# Patient Record
Sex: FEMALE | Race: WHITE | NOT HISPANIC OR LATINO | Employment: FULL TIME | URBAN - METROPOLITAN AREA
[De-identification: names, ages, dates, MRNs, and addresses within clinical notes are randomized per-mention and may not be internally consistent; named-entity substitution may affect disease eponyms.]

---

## 2017-01-16 PROBLEM — M76.899 TENDINITIS OF KNEE: Status: ACTIVE | Noted: 2017-01-16

## 2017-10-24 ENCOUNTER — OFFICE VISIT (OUTPATIENT)
Dept: DERMATOLOGY | Facility: CLINIC | Age: 62
End: 2017-10-24
Payer: COMMERCIAL

## 2017-10-24 VITALS — HEIGHT: 66 IN | WEIGHT: 188 LBS | RESPIRATION RATE: 16 BRPM | BODY MASS INDEX: 30.22 KG/M2

## 2017-10-24 DIAGNOSIS — L57.0 MULTIPLE ACTINIC KERATOSES: Primary | ICD-10-CM

## 2017-10-24 DIAGNOSIS — Z12.83 SKIN CANCER SCREENING: ICD-10-CM

## 2017-10-24 DIAGNOSIS — L82.1 SEBORRHEIC KERATOSES: ICD-10-CM

## 2017-10-24 PROCEDURE — 99999 PR PBB SHADOW E&M-EST. PATIENT-LVL II: CPT | Mod: PBBFAC,,, | Performed by: DERMATOLOGY

## 2017-10-24 PROCEDURE — 17000 DESTRUCT PREMALG LESION: CPT | Mod: S$GLB,,, | Performed by: DERMATOLOGY

## 2017-10-24 PROCEDURE — 17003 DESTRUCT PREMALG LES 2-14: CPT | Mod: S$GLB,,, | Performed by: DERMATOLOGY

## 2017-10-24 PROCEDURE — 99213 OFFICE O/P EST LOW 20 MIN: CPT | Mod: 25,S$GLB,, | Performed by: DERMATOLOGY

## 2017-10-24 NOTE — PROGRESS NOTES
Subjective:       Patient ID:  Elizabeth Lane is a 62 y.o. female who presents for   Chief Complaint   Patient presents with    Skin Check    Lesion     Last seen Sept 2016   Lesion R calf few months - slightly raised , scaly - never treated   Lesion on back - grandaughter noticed a few months ago - asymptomatic   Scar from a grease burn yrs ago -  L hand has itchy , red spot - never treated     Phx AK's - cryo tx   Phx SCC on right foot excised by Gen Surgeon approx 6 years ago  Father hx of skin cancer  Spends much of her time outdoors doing yardwork          Review of Systems   Skin: Positive for itching, dry skin and activity-related sunscreen use. Negative for sensitivity to antibiotic ointment, sensitivity to bandage adhesive and tendency to form keloidal scars.   Hematologic/Lymphatic: Bruises/bleeds easily.        Objective:    Physical Exam   Skin:                          Diagram Legend     Erythematous scaling macule/papule c/w actinic keratosis       Vascular papule c/w angioma      Pigmented verrucoid papule/plaque c/w seborrheic keratosis      Yellow umbilicated papule c/w sebaceous hyperplasia      Irregularly shaped tan macule c/w lentigo     1-2 mm smooth white papules consistent with Milia      Movable subcutaneous cyst with punctum c/w epidermal inclusion cyst      Subcutaneous movable cyst c/w pilar cyst      Firm pink to brown papule c/w dermatofibroma      Pedunculated fleshy papule(s) c/w skin tag(s)      Evenly pigmented macule c/w junctional nevus     Mildly variegated pigmented, slightly irregular-bordered macule c/w mildly atypical nevus      Flesh colored to evenly pigmented papule c/w intradermal nevus       Pink pearly papule/plaque c/w basal cell carcinoma      Erythematous hyperkeratotic cursted plaque c/w SCC      Surgical scar with no sign of skin cancer recurrence      Open and closed comedones      Inflammatory papules and pustules      Verrucoid papule consistent  consistent with wart     Erythematous eczematous patches and plaques     Dystrophic onycholytic nail with subungual debris c/w onychomycosis     Umbilicated papule    Erythematous-base heme-crusted tan verrucoid plaque consistent with inflamed seborrheic keratosis     Erythematous Silvery Scaling Plaque c/w Psoriasis     See annotation      Assessment / Plan:        Multiple actinic keratoses  Cryosurgery Procedure Note    Verbal consent from the patient is obtained and the patient is aware of the precancerous quality and need for treatment of these lesions. Liquid nitrogen cryosurgery is applied to the 5 actinic keratoses, as detailed in the physical exam, to produce a freeze injury. The patient is aware that blisters may form and is instructed on wound care with gentle cleansing and use of vaseline ointment to keep moist until healed. The patient is supplied a handout on cryosurgery and is instructed to call if lesions do not completely resolve. Discussed risk postinflammatory pigmentary changes.       Skin cancer screening  Upper body skin examination performed today including at least 6 points as noted in physical examination. No lesions suspicious for malignancy noted.        Seborrheic keratoses, trunk and extremities  These are benign inherited growths without a malignant potential. Reassurance given to patient. No treatment is necessary.                Return in about 6 months (around 4/24/2018).

## 2018-04-24 ENCOUNTER — OFFICE VISIT (OUTPATIENT)
Dept: DERMATOLOGY | Facility: CLINIC | Age: 63
End: 2018-04-24
Payer: COMMERCIAL

## 2018-04-24 DIAGNOSIS — Z85.828 PERSONAL HISTORY OF OTHER MALIGNANT NEOPLASM OF SKIN: Primary | ICD-10-CM

## 2018-04-24 DIAGNOSIS — L57.0 MULTIPLE ACTINIC KERATOSES: ICD-10-CM

## 2018-04-24 DIAGNOSIS — Z12.83 SKIN CANCER SCREENING: ICD-10-CM

## 2018-04-24 PROCEDURE — 17000 DESTRUCT PREMALG LESION: CPT | Mod: S$GLB,,, | Performed by: DERMATOLOGY

## 2018-04-24 PROCEDURE — 17003 DESTRUCT PREMALG LES 2-14: CPT | Mod: S$GLB,,, | Performed by: DERMATOLOGY

## 2018-04-24 PROCEDURE — 99999 PR PBB SHADOW E&M-EST. PATIENT-LVL II: CPT | Mod: PBBFAC,,, | Performed by: DERMATOLOGY

## 2018-04-24 PROCEDURE — 99213 OFFICE O/P EST LOW 20 MIN: CPT | Mod: 25,S$GLB,, | Performed by: DERMATOLOGY

## 2018-04-24 NOTE — PROGRESS NOTES
Subjective:       Patient ID:  Elizabeth Lane is a 62 y.o. female who presents for   Chief Complaint   Patient presents with    Skin Check     Patient here for skin check. Last seen on 10-. No new or concerning moles today     Phx AK's - cryo tx   Phx SCC on right foot excised by Gen Surgeon approx 6 years ago  Father hx of skin cancer  Spends much of her time outdoors doing yardwork                 scaly area left forearm, comes and goes, weeks. No tx.     Review of Systems   Skin: Positive for activity-related sunscreen use. Negative for itching, rash and daily sunscreen use.        Objective:    Physical Exam   Skin:                      Diagram Legend     Erythematous scaling macule/papule c/w actinic keratosis       Vascular papule c/w angioma      Pigmented verrucoid papule/plaque c/w seborrheic keratosis      Yellow umbilicated papule c/w sebaceous hyperplasia      Irregularly shaped tan macule c/w lentigo     1-2 mm smooth white papules consistent with Milia      Movable subcutaneous cyst with punctum c/w epidermal inclusion cyst      Subcutaneous movable cyst c/w pilar cyst      Firm pink to brown papule c/w dermatofibroma      Pedunculated fleshy papule(s) c/w skin tag(s)      Evenly pigmented macule c/w junctional nevus     Mildly variegated pigmented, slightly irregular-bordered macule c/w mildly atypical nevus      Flesh colored to evenly pigmented papule c/w intradermal nevus       Pink pearly papule/plaque c/w basal cell carcinoma      Erythematous hyperkeratotic cursted plaque c/w SCC      Surgical scar with no sign of skin cancer recurrence      Open and closed comedones      Inflammatory papules and pustules      Verrucoid papule consistent consistent with wart     Erythematous eczematous patches and plaques     Dystrophic onycholytic nail with subungual debris c/w onychomycosis     Umbilicated papule    Erythematous-base heme-crusted tan verrucoid plaque consistent with inflamed  seborrheic keratosis     Erythematous Silvery Scaling Plaque c/w Psoriasis     See annotation      Assessment / Plan:        Personal history of other malignant neoplasm of skin  Area(s) of previous NMSC evaluated with no signs of recurrence.    Upper body skin examination performed today including at least 6 points as noted in physical examination. No lesions suspicious for malignancy noted.    Skin cancer screening  Area(s) of previous NMSC evaluated with no signs of recurrence.    Upper body skin examination performed today including at least 6 points as noted in physical examination. No lesions suspicious for malignancy noted.      Multiple actinic keratoses  Cryosurgery Procedure Note    Verbal consent from the patient is obtained and the patient is aware of the precancerous quality and need for treatment of these lesions. Liquid nitrogen cryosurgery is applied to the 2 actinic keratoses, as detailed in the physical exam, to produce a freeze injury. The patient is aware that blisters may form and is instructed on wound care with gentle cleansing and use of vaseline ointment to keep moist until healed. The patient is supplied a handout on cryosurgery and is instructed to call if lesions do not completely resolve. Discussed risk postinflammatory pigmentary changes.                Follow-up in about 6 months (around 10/24/2018).

## 2019-01-31 ENCOUNTER — OFFICE VISIT (OUTPATIENT)
Dept: DERMATOLOGY | Facility: CLINIC | Age: 64
End: 2019-01-31
Payer: COMMERCIAL

## 2019-01-31 DIAGNOSIS — L82.1 SEBORRHEIC KERATOSES: Primary | ICD-10-CM

## 2019-01-31 PROCEDURE — 99213 OFFICE O/P EST LOW 20 MIN: CPT | Mod: S$GLB,,, | Performed by: DERMATOLOGY

## 2019-01-31 PROCEDURE — 99999 PR PBB SHADOW E&M-EST. PATIENT-LVL II: ICD-10-PCS | Mod: PBBFAC,,, | Performed by: DERMATOLOGY

## 2019-01-31 PROCEDURE — 99999 PR PBB SHADOW E&M-EST. PATIENT-LVL II: CPT | Mod: PBBFAC,,, | Performed by: DERMATOLOGY

## 2019-01-31 PROCEDURE — 99213 PR OFFICE/OUTPT VISIT, EST, LEVL III, 20-29 MIN: ICD-10-PCS | Mod: S$GLB,,, | Performed by: DERMATOLOGY

## 2019-10-11 ENCOUNTER — OFFICE VISIT (OUTPATIENT)
Dept: DERMATOLOGY | Facility: CLINIC | Age: 64
End: 2019-10-11
Payer: COMMERCIAL

## 2019-10-11 VITALS — BODY MASS INDEX: 31 KG/M2 | HEIGHT: 66 IN | RESPIRATION RATE: 18 BRPM | WEIGHT: 192.88 LBS

## 2019-10-11 DIAGNOSIS — Q82.8 POROKERATOSIS: Primary | ICD-10-CM

## 2019-10-11 DIAGNOSIS — L57.0 ACTINIC KERATOSIS: ICD-10-CM

## 2019-10-11 PROCEDURE — 99214 PR OFFICE/OUTPT VISIT, EST, LEVL IV, 30-39 MIN: ICD-10-PCS | Mod: S$GLB,,, | Performed by: DERMATOLOGY

## 2019-10-11 PROCEDURE — 99999 PR PBB SHADOW E&M-EST. PATIENT-LVL III: ICD-10-PCS | Mod: PBBFAC,,, | Performed by: DERMATOLOGY

## 2019-10-11 PROCEDURE — 99999 PR PBB SHADOW E&M-EST. PATIENT-LVL III: CPT | Mod: PBBFAC,,, | Performed by: DERMATOLOGY

## 2019-10-11 PROCEDURE — 99214 OFFICE O/P EST MOD 30 MIN: CPT | Mod: S$GLB,,, | Performed by: DERMATOLOGY

## 2019-10-11 RX ORDER — FLUOROURACIL 50 MG/G
CREAM TOPICAL 2 TIMES DAILY
Qty: 40 G | Refills: 1 | Status: SHIPPED | OUTPATIENT
Start: 2019-10-11 | End: 2020-01-08

## 2019-10-11 NOTE — PROGRESS NOTES
Subjective:       Patient ID:  Elizabeth Lane is a 64 y.o. female who presents for   Chief Complaint   Patient presents with    Spot     HPI  65 yo F presents for follow up.  Last OV 1/31/2019.     Patient complains of lesion(s)  Location: B lower legs  Duration: 3 months  Symptoms: NA  Relieving factors/Previous treatments: Not bothering the patient she would just like to have them checked.     No family h/o similar lesions    Review of Systems   Constitutional: Negative for fever, chills and fatigue.   Skin: Positive for itching, dry skin, daily sunscreen use and activity-related sunscreen use. Negative for rash and wears hat.   Hematologic/Lymphatic: Bruises/bleeds easily.       Past Medical History:   Diagnosis Date    Congenital coronary artery anomaly     Liver enzyme elevation     Pure hypercholesterolemia     Scoliosis (and kyphoscoliosis), idiopathic     THORAIC SPINE    Unspecified hypothyroidism        Objective:    Physical Exam   Constitutional: She appears well-developed and well-nourished.   HENT:   Mouth/Throat: Lips normal.    Eyes: Lids are normal.    Cardiovascular: There is no local extremity swelling.     Neurological: She is alert and oriented to person, place, and time.   Psychiatric: She has a normal mood and affect.   Skin:   Areas Examined (abnormalities noted in diagram):   Head / Face Inspection Performed  Neck Inspection Performed  RUE Inspected  LUE Inspection Performed  RLE Inspected  LLE Inspection Performed              Diagram Legend     Erythematous scaling macule/papule c/w actinic keratosis       Vascular papule c/w angioma      Pigmented verrucoid papule/plaque c/w seborrheic keratosis      Yellow umbilicated papule c/w sebaceous hyperplasia      Irregularly shaped tan macule c/w lentigo     1-2 mm smooth white papules consistent with Milia      Movable subcutaneous cyst with punctum c/w epidermal inclusion cyst      Subcutaneous movable cyst c/w pilar cyst      Firm  pink to brown papule c/w dermatofibroma      Pedunculated fleshy papule(s) c/w skin tag(s)      Evenly pigmented macule c/w junctional nevus     Mildly variegated pigmented, slightly irregular-bordered macule c/w mildly atypical nevus      Flesh colored to evenly pigmented papule c/w intradermal nevus       Pink pearly papule/plaque c/w basal cell carcinoma      Erythematous hyperkeratotic cursted plaque c/w SCC      Surgical scar with no sign of skin cancer recurrence      Open and closed comedones      Inflammatory papules and pustules      Verrucoid papule consistent consistent with wart     Erythematous eczematous patches and plaques     Dystrophic onycholytic nail with subungual debris c/w onychomycosis     Umbilicated papule    Erythematous-base heme-crusted tan verrucoid plaque consistent with inflamed seborrheic keratosis     Erythematous Silvery Scaling Plaque c/w Psoriasis     See annotation      Assessment / Plan:        Porokeratoses (DSAP)/few AKs  Handout provided  Niacinamide 500 mg bid  Discussed the likely erythema, crusting, swelling, inflammation and showed photos of what to expect  -     fluorouracil (EFUDEX) 5 % cream; Apply topically 2 (two) times daily.  Dispense: 40 g; Refill: 1             Follow up in about 4 weeks (around 11/8/2019).

## 2019-10-23 ENCOUNTER — PATIENT MESSAGE (OUTPATIENT)
Dept: DERMATOLOGY | Facility: CLINIC | Age: 64
End: 2019-10-23

## 2019-10-24 ENCOUNTER — OFFICE VISIT (OUTPATIENT)
Dept: ENDOCRINOLOGY | Facility: CLINIC | Age: 64
End: 2019-10-24
Payer: COMMERCIAL

## 2019-10-24 ENCOUNTER — TELEPHONE (OUTPATIENT)
Dept: ENDOCRINOLOGY | Facility: CLINIC | Age: 64
End: 2019-10-24

## 2019-10-24 VITALS
HEIGHT: 66 IN | SYSTOLIC BLOOD PRESSURE: 162 MMHG | BODY MASS INDEX: 31.18 KG/M2 | WEIGHT: 194 LBS | DIASTOLIC BLOOD PRESSURE: 80 MMHG | HEART RATE: 70 BPM

## 2019-10-24 DIAGNOSIS — E03.9 HYPOTHYROIDISM, UNSPECIFIED TYPE: Primary | ICD-10-CM

## 2019-10-24 DIAGNOSIS — E03.9 ACQUIRED HYPOTHYROIDISM: ICD-10-CM

## 2019-10-24 DIAGNOSIS — R53.83 FATIGUE, UNSPECIFIED TYPE: ICD-10-CM

## 2019-10-24 PROCEDURE — 99204 PR OFFICE/OUTPT VISIT, NEW, LEVL IV, 45-59 MIN: ICD-10-PCS | Mod: S$GLB,,, | Performed by: INTERNAL MEDICINE

## 2019-10-24 PROCEDURE — 99204 OFFICE O/P NEW MOD 45 MIN: CPT | Mod: S$GLB,,, | Performed by: INTERNAL MEDICINE

## 2019-10-24 PROCEDURE — 99999 PR PBB SHADOW E&M-EST. PATIENT-LVL III: CPT | Mod: PBBFAC,,, | Performed by: INTERNAL MEDICINE

## 2019-10-24 PROCEDURE — 99999 PR PBB SHADOW E&M-EST. PATIENT-LVL III: ICD-10-PCS | Mod: PBBFAC,,, | Performed by: INTERNAL MEDICINE

## 2019-10-24 RX ORDER — LEVOTHYROXINE SODIUM 112 UG/1
112 TABLET ORAL DAILY
Qty: 30 TABLET | Refills: 11
Start: 2019-10-24 | End: 2020-07-09 | Stop reason: SDUPTHER

## 2019-10-24 RX ORDER — NIACINAMIDE 500 MG
500 TABLET ORAL NIGHTLY
COMMUNITY
End: 2020-07-09

## 2019-10-24 NOTE — PROGRESS NOTES
CHIEF COMPLAINT: Elevated TSH  64 year old being seen as a new patient. Hypothyroidism since early 40's. On synthroid 112 mcg. On brandname. Dose increased based on 9/6/19 labs.  Takes it by itself. Has not missed doses. States still fatigued but somewhat better. Sleeping 6 hours of sleep. No snoring.  Has difficulty losing weight. States sometimes BP at home usually 130/70's.       PAST MEDICAL HISTORY/PAST SURGICAL HISTORY:  Reviewed in [facility]    SOCIAL HISTORY: No T/A    FAMILY HISTORY:  No known thyroid disease. + Dm 2.     MEDICATIONS/ALLERGIES: The patient's MedCard has been updated and reviewed.      ROS:   Constitutional: No recent significant weight change  Eyes: No recent visual changes  ENT: No dysphagia  Cardiovascular: Denies current anginal symptoms  Respiratory: Denies current respiratory difficulty  Gastrointestinal: Denies recent bowel disturbances  GenitoUrinary - No dysuria  Skin: No new skin rash  Neurologic: No focal neurologic complaints  Remainder ROS negative        PE:    GENERAL: Well developed, well nourished.  PSYCH:  appropriate mood and affect  EYES:  PERRL, EOM intact.  ENT: Nares patent, oropharynx clear, mucosa pink,   NECK: Supple, trachea midline, No palpable thyroid nodules.   CHEST: Resp even and unlabored, CTA bilateral.  CARDIAC: RRR, S1, S2 heard, no murmurs, rubs, S3, or S4  ABDOMEN: Soft, non-tender, non-distended;  No organomegaly  VASCULAR:  DP pulses +2/4 bilaterally, no edema  NEURO: Gait steady, CN II-VII grossly intact  SKIN: No areas of breakdown, no acanthosis nigracans.    LABS   Results for PHANI FORTUNE (MRN [id number]) as of 10/24/2019 09:19   Ref. Range 9/6/2019 07:47   TSH Latest Ref Range: 0.400 - 4.000 uIU/mL 7.610 (H)       ASSESSMENT/PLAN:  1. Hypothyroidism- Last TSH elevated. Main symptoms are fatigue and decreased ability to lose weight. Dose recently increased. She is taking it by itself. Will check TFT.     2. HTN- BP elevated. Repeat elevated as  well. States BP lower at home. Discussed that abnormal TFT can cause elevated BP    3. Fatigue- discussed diet and exercise and sleep adequacy      FOLLOWUP  TSH, FT4- Today  F/u 1 yr with TSH, FT4

## 2019-11-13 ENCOUNTER — OFFICE VISIT (OUTPATIENT)
Dept: DERMATOLOGY | Facility: CLINIC | Age: 64
End: 2019-11-13
Payer: COMMERCIAL

## 2019-11-13 VITALS — WEIGHT: 194 LBS | HEIGHT: 66 IN | BODY MASS INDEX: 31.18 KG/M2

## 2019-11-13 DIAGNOSIS — L81.9 HYPERPIGMENTATION: ICD-10-CM

## 2019-11-13 DIAGNOSIS — Q82.8 POROKERATOSIS: Primary | ICD-10-CM

## 2019-11-13 DIAGNOSIS — L82.1 SEBORRHEIC KERATOSES: ICD-10-CM

## 2019-11-13 PROCEDURE — 99213 PR OFFICE/OUTPT VISIT, EST, LEVL III, 20-29 MIN: ICD-10-PCS | Mod: S$GLB,,, | Performed by: DERMATOLOGY

## 2019-11-13 PROCEDURE — 99999 PR PBB SHADOW E&M-EST. PATIENT-LVL II: ICD-10-PCS | Mod: PBBFAC,,, | Performed by: DERMATOLOGY

## 2019-11-13 PROCEDURE — 99999 PR PBB SHADOW E&M-EST. PATIENT-LVL II: CPT | Mod: PBBFAC,,, | Performed by: DERMATOLOGY

## 2019-11-13 PROCEDURE — 99213 OFFICE O/P EST LOW 20 MIN: CPT | Mod: S$GLB,,, | Performed by: DERMATOLOGY

## 2019-11-13 NOTE — PROGRESS NOTES
Subjective:       Patient ID:  Elizabeth Lane is a 64 y.o. female who presents for   Chief Complaint   Patient presents with    Follow-up     65 yo F presents for follow up.  Last office visit 10/11/2019. The lesions on her legs resolved after a few weeks of applying Efudex and taking niacinamide 500 mg bid.   She tried applying Efudex to some brown spots on her chest that she wanted to try to treat and noticed a very painful, red, itchy rash so she stopped applying it.     Derm Hx: SCC - 2012; AKs  Denies history of family skin cancer           Past Medical History:   Diagnosis Date    Congenital coronary artery anomaly     Liver enzyme elevation     Pure hypercholesterolemia     Scoliosis (and kyphoscoliosis), idiopathic     THORAIC SPINE    Unspecified hypothyroidism      Review of Systems   Constitutional: Negative for fever, chills and fatigue.   Skin: Positive for itching, dry skin, daily sunscreen use and activity-related sunscreen use. Negative for rash and wears hat.   Hematologic/Lymphatic: Bruises/bleeds easily.        Objective:    Physical Exam   Constitutional: She appears well-developed and well-nourished.   HENT:   Mouth/Throat: Lips normal.    Eyes: Lids are normal.    Cardiovascular: There is no local extremity swelling.     Neurological: She is alert and oriented to person, place, and time.   Psychiatric: She has a normal mood and affect.   Skin:   Areas Examined (abnormalities noted in diagram):   Head / Face Inspection Performed  Neck Inspection Performed  Chest / Axilla Inspection Performed  Abdomen Inspection Performed  RUE Inspected  LUE Inspection Performed  RLE Inspected  LLE Inspection Performed              Diagram Legend     Erythematous scaling macule/papule c/w actinic keratosis       Vascular papule c/w angioma      Pigmented verrucoid papule/plaque c/w seborrheic keratosis      Yellow umbilicated papule c/w sebaceous hyperplasia      Irregularly shaped tan macule c/w  lentigo     1-2 mm smooth white papules consistent with Milia      Movable subcutaneous cyst with punctum c/w epidermal inclusion cyst      Subcutaneous movable cyst c/w pilar cyst      Firm pink to brown papule c/w dermatofibroma      Pedunculated fleshy papule(s) c/w skin tag(s)      Evenly pigmented macule c/w junctional nevus     Mildly variegated pigmented, slightly irregular-bordered macule c/w mildly atypical nevus      Flesh colored to evenly pigmented papule c/w intradermal nevus       Pink pearly papule/plaque c/w basal cell carcinoma      Erythematous hyperkeratotic cursted plaque c/w SCC      Surgical scar with no sign of skin cancer recurrence      Open and closed comedones      Inflammatory papules and pustules      Verrucoid papule consistent consistent with wart     Erythematous eczematous patches and plaques     Dystrophic onycholytic nail with subungual debris c/w onychomycosis     Umbilicated papule    Erythematous-base heme-crusted tan verrucoid plaque consistent with inflamed seborrheic keratosis     Erythematous Silvery Scaling Plaque c/w Psoriasis     See annotation      Assessment / Plan:        Porokeratosis/DSAP  Now clear  Did this respond to the Efudex or niacinamide? Unsure but pt will likely continue the niacinamide for now    Seborrheic keratoses  These are benign inherited growths without a malignant potential. Reassurance given to patient. No treatment is necessary.     Hyperpigmentation  PIPA s/p Efudex to abdomen/inframammary  Do not use in this area anymore           Follow up in about 1 year (around 11/13/2020).

## 2020-01-08 PROBLEM — R25.2 MUSCLE CRAMPS: Status: ACTIVE | Noted: 2020-01-08

## 2021-10-27 ENCOUNTER — OFFICE VISIT (OUTPATIENT)
Dept: OBSTETRICS AND GYNECOLOGY | Facility: CLINIC | Age: 66
End: 2021-10-27
Payer: COMMERCIAL

## 2021-10-27 VITALS — SYSTOLIC BLOOD PRESSURE: 120 MMHG | DIASTOLIC BLOOD PRESSURE: 70 MMHG | BODY MASS INDEX: 31.47 KG/M2 | WEIGHT: 195 LBS

## 2021-10-27 DIAGNOSIS — Z12.72 SPECIAL SCREENING FOR MALIGNANT NEOPLASMS, VAGINA: ICD-10-CM

## 2021-10-27 DIAGNOSIS — Z01.419 ENCOUNTER FOR ANNUAL ROUTINE GYNECOLOGICAL EXAMINATION: Primary | ICD-10-CM

## 2021-10-27 PROCEDURE — 88175 CYTOPATH C/V AUTO FLUID REDO: CPT | Performed by: OBSTETRICS & GYNECOLOGY

## 2021-10-27 PROCEDURE — 99397 PR PREVENTIVE VISIT,EST,65 & OVER: ICD-10-PCS | Mod: S$GLB,,, | Performed by: OBSTETRICS & GYNECOLOGY

## 2021-10-27 PROCEDURE — 99999 PR PBB SHADOW E&M-EST. PATIENT-LVL III: CPT | Mod: PBBFAC,,, | Performed by: OBSTETRICS & GYNECOLOGY

## 2021-10-27 PROCEDURE — 99397 PER PM REEVAL EST PAT 65+ YR: CPT | Mod: S$GLB,,, | Performed by: OBSTETRICS & GYNECOLOGY

## 2021-10-27 PROCEDURE — 99999 PR PBB SHADOW E&M-EST. PATIENT-LVL III: ICD-10-PCS | Mod: PBBFAC,,, | Performed by: OBSTETRICS & GYNECOLOGY

## 2021-11-03 LAB
FINAL PATHOLOGIC DIAGNOSIS: NORMAL
Lab: NORMAL

## 2022-08-01 ENCOUNTER — TELEPHONE (OUTPATIENT)
Dept: DERMATOLOGY | Facility: CLINIC | Age: 67
End: 2022-08-01
Payer: COMMERCIAL

## 2022-08-01 NOTE — TELEPHONE ENCOUNTER
----- Message from Jane Ellington sent at 8/1/2022  9:44 AM CDT -----  Contact: Patient  Type:  Patient Call          Who Called:Patient         Does the patient know what this is regarding?:requesting a call back to schedule a appt for her cancer screening states she way over due ; she's an established PT please advise           Would the patient rather a call back or a response via [facility]? Call           Best Call Back Number: [phone]             Additional Information:

## 2022-10-03 ENCOUNTER — OFFICE VISIT (OUTPATIENT)
Dept: URGENT CARE | Facility: CLINIC | Age: 67
End: 2022-10-03
Payer: COMMERCIAL

## 2022-10-03 VITALS
RESPIRATION RATE: 18 BRPM | DIASTOLIC BLOOD PRESSURE: 82 MMHG | OXYGEN SATURATION: 99 % | HEIGHT: 66 IN | BODY MASS INDEX: 32.78 KG/M2 | TEMPERATURE: 99 F | WEIGHT: 204 LBS | HEART RATE: 102 BPM | SYSTOLIC BLOOD PRESSURE: 153 MMHG

## 2022-10-03 DIAGNOSIS — B34.9 ACUTE VIRAL SYNDROME: Primary | ICD-10-CM

## 2022-10-03 DIAGNOSIS — R05.9 COUGH, UNSPECIFIED TYPE: ICD-10-CM

## 2022-10-03 LAB
CTP QC/QA: YES
CTP QC/QA: YES
POC MOLECULAR INFLUENZA A AGN: NEGATIVE
POC MOLECULAR INFLUENZA B AGN: NEGATIVE
SARS-COV-2 RDRP RESP QL NAA+PROBE: NEGATIVE

## 2022-10-03 PROCEDURE — 3079F DIAST BP 80-89 MM HG: CPT | Mod: CPTII,S$GLB,, | Performed by: PHYSICIAN ASSISTANT

## 2022-10-03 PROCEDURE — 1125F AMNT PAIN NOTED PAIN PRSNT: CPT | Mod: CPTII,S$GLB,, | Performed by: PHYSICIAN ASSISTANT

## 2022-10-03 PROCEDURE — 4010F PR ACE/ARB THEARPY RXD/TAKEN: ICD-10-PCS | Mod: CPTII,S$GLB,, | Performed by: PHYSICIAN ASSISTANT

## 2022-10-03 PROCEDURE — 3008F PR BODY MASS INDEX (BMI) DOCUMENTED: ICD-10-PCS | Mod: CPTII,S$GLB,, | Performed by: PHYSICIAN ASSISTANT

## 2022-10-03 PROCEDURE — 99213 PR OFFICE/OUTPT VISIT, EST, LEVL III, 20-29 MIN: ICD-10-PCS | Mod: S$GLB,,, | Performed by: PHYSICIAN ASSISTANT

## 2022-10-03 PROCEDURE — 1160F PR REVIEW ALL MEDS BY PRESCRIBER/CLIN PHARMACIST DOCUMENTED: ICD-10-PCS | Mod: CPTII,S$GLB,, | Performed by: PHYSICIAN ASSISTANT

## 2022-10-03 PROCEDURE — 1159F PR MEDICATION LIST DOCUMENTED IN MEDICAL RECORD: ICD-10-PCS | Mod: CPTII,S$GLB,, | Performed by: PHYSICIAN ASSISTANT

## 2022-10-03 PROCEDURE — 1159F MED LIST DOCD IN RCRD: CPT | Mod: CPTII,S$GLB,, | Performed by: PHYSICIAN ASSISTANT

## 2022-10-03 PROCEDURE — U0002 COVID-19 LAB TEST NON-CDC: HCPCS | Mod: QW,S$GLB,, | Performed by: PHYSICIAN ASSISTANT

## 2022-10-03 PROCEDURE — 87502 INFLUENZA DNA AMP PROBE: CPT | Mod: QW,S$GLB,, | Performed by: PHYSICIAN ASSISTANT

## 2022-10-03 PROCEDURE — 99213 OFFICE O/P EST LOW 20 MIN: CPT | Mod: S$GLB,,, | Performed by: PHYSICIAN ASSISTANT

## 2022-10-03 PROCEDURE — 3077F PR MOST RECENT SYSTOLIC BLOOD PRESSURE >= 140 MM HG: ICD-10-PCS | Mod: CPTII,S$GLB,, | Performed by: PHYSICIAN ASSISTANT

## 2022-10-03 PROCEDURE — 1125F PR PAIN SEVERITY QUANTIFIED, PAIN PRESENT: ICD-10-PCS | Mod: CPTII,S$GLB,, | Performed by: PHYSICIAN ASSISTANT

## 2022-10-03 PROCEDURE — U0002: ICD-10-PCS | Mod: QW,S$GLB,, | Performed by: PHYSICIAN ASSISTANT

## 2022-10-03 PROCEDURE — 4010F ACE/ARB THERAPY RXD/TAKEN: CPT | Mod: CPTII,S$GLB,, | Performed by: PHYSICIAN ASSISTANT

## 2022-10-03 PROCEDURE — 87502 POCT INFLUENZA A/B MOLECULAR: ICD-10-PCS | Mod: QW,S$GLB,, | Performed by: PHYSICIAN ASSISTANT

## 2022-10-03 PROCEDURE — 3079F PR MOST RECENT DIASTOLIC BLOOD PRESSURE 80-89 MM HG: ICD-10-PCS | Mod: CPTII,S$GLB,, | Performed by: PHYSICIAN ASSISTANT

## 2022-10-03 PROCEDURE — 3077F SYST BP >= 140 MM HG: CPT | Mod: CPTII,S$GLB,, | Performed by: PHYSICIAN ASSISTANT

## 2022-10-03 PROCEDURE — 1160F RVW MEDS BY RX/DR IN RCRD: CPT | Mod: CPTII,S$GLB,, | Performed by: PHYSICIAN ASSISTANT

## 2022-10-03 PROCEDURE — 3008F BODY MASS INDEX DOCD: CPT | Mod: CPTII,S$GLB,, | Performed by: PHYSICIAN ASSISTANT

## 2022-10-03 RX ORDER — PROMETHAZINE HYDROCHLORIDE AND DEXTROMETHORPHAN HYDROBROMIDE 6.25; 15 MG/5ML; MG/5ML
5 SYRUP ORAL
Qty: 118 ML | Refills: 0 | Status: SHIPPED | OUTPATIENT
Start: 2022-10-03 | End: 2022-11-18

## 2022-10-03 NOTE — PROGRESS NOTES
"Subjective:       Patient ID: Elizabeth Lane is a 67 y.o. female.    Vitals:  height is 5' 6" (1.676 m) and weight is 92.5 kg (204 lb). Her temporal temperature is 98.5 °F (36.9 °C). Her blood pressure is 153/82 (abnormal) and her pulse is 102. Her respiration is 18 and oxygen saturation is 99%.     Chief Complaint: Cough    Pt c/o cough, congestion headaches, otalgia, sore throat, swollen glands, chills and fatigue x 3 days. Has been exposed to granddaughter who was Flu A+. Tylenol and OTC cold/flu with mild relief.  Patient also reports getting her Prevnar vaccine few weeks ago.  Says she had vertigo for about a week that resolved few days ago.    Cough  This is a new problem. The current episode started in the past 7 days. The problem has been unchanged. The cough is Non-productive. Associated symptoms include chills, ear congestion, ear pain, headaches, nasal congestion and a sore throat. Pertinent negatives include no chest pain, rash or shortness of breath. She has tried OTC cough suppressant (acetaminophen) for the symptoms. The treatment provided mild relief.     Constitution: Positive for chills.   HENT:  Positive for ear pain and sore throat.    Neck: Positive for painful lymph nodes.   Cardiovascular:  Negative for chest pain.   Respiratory:  Positive for cough. Negative for shortness of breath.    Gastrointestinal:  Negative for nausea and vomiting.   Skin:  Negative for rash.   Neurological:  Positive for headaches.   Hematologic/Lymphatic: Positive for swollen lymph nodes.     Objective:      Physical Exam   Constitutional: She is oriented to person, place, and time. She appears well-developed. She is cooperative.  Non-toxic appearance. She does not appear ill. No distress.   HENT:   Head: Normocephalic and atraumatic.   Ears:   Right Ear: Hearing, tympanic membrane, external ear and ear canal normal.   Left Ear: Hearing, tympanic membrane, external ear and ear canal normal.   Nose: Mucosal edema " present. No rhinorrhea or nasal deformity. No epistaxis. Right sinus exhibits no maxillary sinus tenderness and no frontal sinus tenderness. Left sinus exhibits no maxillary sinus tenderness and no frontal sinus tenderness.   Mouth/Throat: Uvula is midline, oropharynx is clear and moist and mucous membranes are normal. No trismus in the jaw. Normal dentition. No uvula swelling. No oropharyngeal exudate, posterior oropharyngeal edema or posterior oropharyngeal erythema.   Eyes: Conjunctivae and lids are normal. No scleral icterus.   Neck: Trachea normal and phonation normal. Neck supple. No edema present. No erythema present. No neck rigidity present.   Cardiovascular: Normal rate, regular rhythm, normal heart sounds and normal pulses.   Pulmonary/Chest: Effort normal and breath sounds normal. No respiratory distress. She has no decreased breath sounds. She has no rhonchi.   Abdominal: Normal appearance.   Musculoskeletal: Normal range of motion.         General: No deformity. Normal range of motion.   Neurological: She is alert and oriented to person, place, and time. She exhibits normal muscle tone. Coordination normal.   Skin: Skin is warm, dry, intact, not diaphoretic and not pale.   Psychiatric: Her speech is normal and behavior is normal. Judgment and thought content normal.   Nursing note and vitals reviewed.        Results for orders placed or performed in visit on 10/03/22   POCT Influenza A/B MOLECULAR   Result Value Ref Range    POC Molecular Influenza A Ag Negative Negative, Not Reported    POC Molecular Influenza B Ag Negative Negative, Not Reported     Acceptable Yes    POCT COVID-19 Rapid Screening   Result Value Ref Range    POC Rapid COVID Negative Negative     Acceptable Yes        Assessment:       1. Acute viral syndrome    2. Cough, unspecified type          Plan:         Acute viral syndrome  -     (Magic mouthwash) 1:1:1 diphenhydramine(Benadryl) 12.5mg/5ml liq,  aluminum & magnesium hydroxide-simethicone (Maalox), LIDOcaine viscous 2%; Swish and spit 5 mLs every 4 (four) hours as needed (sore throat).  Dispense: 60 mL; Refill: 0  -     promethazine-dextromethorphan (PROMETHAZINE-DM) 6.25-15 mg/5 mL Syrp; Take 5 mLs by mouth every 4 to 6 hours as needed (cough).  Dispense: 118 mL; Refill: 0    Cough, unspecified type  -     POCT Influenza A/B MOLECULAR  -     POCT COVID-19 Rapid Screening       Patient Instructions   Please drink plenty of fluids.    Please get plenty of rest.    Please return here or go to the Emergency Department for any concerns or worsening of condition.    If you were prescribed antibiotics, please take them to completion.    If you were prescribed a narcotic medication, do not drive or operate heavy equipment or machinery while taking these medications.    OVER THE COUNTER RECOMMENDATIONS/SUGGESTIONS.     Use Nasal Saline to mechanically move any post nasal drip from your eustachian tube or from the back of your throat.     Use warm salt water gargles to ease your throat pain. Warm salt water gargles as needed for sore throat-  1/2 tsp salt to 1 cup warm water, gargle as desired.     Use an antihistamine such as Claritin, Zyrtec or Allegra to dry you out.      Use pseudoephedrine (behind the counter) to decongest. Pseudoephedrine  30 mg up to 240 mg /day. It can raise your blood pressure and give you palpitations.    If you do not have Hypertension or any history of palpitations, it is ok to take over the counter Sudafed or Mucinex D or Allegra-D or Claritin-D or Zyrtec-D.  If you do take one of the above, it is ok to combine that with plain over the counter Mucinex or Allegra or Claritin or Zyrtec.  If for example you are taking Zyrtec -D, you can combine that with Mucinex, but not Mucinex-D.  If you are taking Mucinex-D, you can combine that with plain Allegra or Claritin or Zyrtec.     If you do have Hypertension or palpitations, it is safe to take  Coricidin HBP for relief of sinus symptoms.     Use Afrin in each nare for no longer than 3 days, as it is addictive. It can also dry out your mucous membranes and cause elevated blood pressure. This is especially useful if you are flying.     Use Flonase 1-2 sprays/nostril per day. It is a local acting steroid nasal spray, if you develop a bloody nose, stop using the medication immediately.     Sometimes Nyquil at night is beneficial to help you get some rest, however it is sedating and it does have an antihistamine, and tylenol.     Honey is a natural cough suppressant that can be used.     Tylenol up to 4,000 mg a day is safe for short periods and can be used for body aches, pain, and fever. However in high doses and prolonged use it can cause liver irritation.     Ibuprofen is a non-steroidal anti-inflammatory that can be used for body aches, pain, and fever.However it can also cause stomach irritation if over used.    If not allergic, please take over the counter Tylenol (Acetaminophen) and/or Motrin (Ibuprofen) as directed for control of pain and/or fever.    Please follow up with your primary care doctor or specialist as needed.    If you  smoke, please stop smoking.

## 2022-10-03 NOTE — PATIENT INSTRUCTIONS
Please drink plenty of fluids.    Please get plenty of rest.    Please return here or go to the Emergency Department for any concerns or worsening of condition.    If you were prescribed antibiotics, please take them to completion.    If you were prescribed a narcotic medication, do not drive or operate heavy equipment or machinery while taking these medications.    OVER THE COUNTER RECOMMENDATIONS/SUGGESTIONS.     Use Nasal Saline to mechanically move any post nasal drip from your eustachian tube or from the back of your throat.     Use warm salt water gargles to ease your throat pain. Warm salt water gargles as needed for sore throat-  1/2 tsp salt to 1 cup warm water, gargle as desired.     Use an antihistamine such as Claritin, Zyrtec or Allegra to dry you out.      Use pseudoephedrine (behind the counter) to decongest. Pseudoephedrine  30 mg up to 240 mg /day. It can raise your blood pressure and give you palpitations.    If you do not have Hypertension or any history of palpitations, it is ok to take over the counter Sudafed or Mucinex D or Allegra-D or Claritin-D or Zyrtec-D.  If you do take one of the above, it is ok to combine that with plain over the counter Mucinex or Allegra or Claritin or Zyrtec.  If for example you are taking Zyrtec -D, you can combine that with Mucinex, but not Mucinex-D.  If you are taking Mucinex-D, you can combine that with plain Allegra or Claritin or Zyrtec.     If you do have Hypertension or palpitations, it is safe to take Coricidin HBP for relief of sinus symptoms.     Use Afrin in each nare for no longer than 3 days, as it is addictive. It can also dry out your mucous membranes and cause elevated blood pressure. This is especially useful if you are flying.     Use Flonase 1-2 sprays/nostril per day. It is a local acting steroid nasal spray, if you develop a bloody nose, stop using the medication immediately.     Sometimes Nyquil at night is beneficial to help you get some  rest, however it is sedating and it does have an antihistamine, and tylenol.     Honey is a natural cough suppressant that can be used.     Tylenol up to 4,000 mg a day is safe for short periods and can be used for body aches, pain, and fever. However in high doses and prolonged use it can cause liver irritation.     Ibuprofen is a non-steroidal anti-inflammatory that can be used for body aches, pain, and fever.However it can also cause stomach irritation if over used.    If not allergic, please take over the counter Tylenol (Acetaminophen) and/or Motrin (Ibuprofen) as directed for control of pain and/or fever.    Please follow up with your primary care doctor or specialist as needed.    If you  smoke, please stop smoking.

## 2022-10-11 ENCOUNTER — OFFICE VISIT (OUTPATIENT)
Dept: DERMATOLOGY | Facility: CLINIC | Age: 67
End: 2022-10-11
Payer: COMMERCIAL

## 2022-10-11 VITALS — RESPIRATION RATE: 18 BRPM | BODY MASS INDEX: 32.42 KG/M2 | HEIGHT: 66 IN | WEIGHT: 201.75 LBS

## 2022-10-11 DIAGNOSIS — L82.1 SEBORRHEIC KERATOSES: ICD-10-CM

## 2022-10-11 DIAGNOSIS — Z85.828 PERSONAL HISTORY OF OTHER MALIGNANT NEOPLASM OF SKIN: Primary | ICD-10-CM

## 2022-10-11 DIAGNOSIS — Z12.83 SKIN CANCER SCREENING: ICD-10-CM

## 2022-10-11 PROCEDURE — 1126F PR PAIN SEVERITY QUANTIFIED, NO PAIN PRESENT: ICD-10-PCS | Mod: CPTII,S$GLB,, | Performed by: DERMATOLOGY

## 2022-10-11 PROCEDURE — 1160F PR REVIEW ALL MEDS BY PRESCRIBER/CLIN PHARMACIST DOCUMENTED: ICD-10-PCS | Mod: CPTII,S$GLB,, | Performed by: DERMATOLOGY

## 2022-10-11 PROCEDURE — 1159F MED LIST DOCD IN RCRD: CPT | Mod: CPTII,S$GLB,, | Performed by: DERMATOLOGY

## 2022-10-11 PROCEDURE — 4010F PR ACE/ARB THEARPY RXD/TAKEN: ICD-10-PCS | Mod: CPTII,S$GLB,, | Performed by: DERMATOLOGY

## 2022-10-11 PROCEDURE — 1101F PR PT FALLS ASSESS DOC 0-1 FALLS W/OUT INJ PAST YR: ICD-10-PCS | Mod: CPTII,S$GLB,, | Performed by: DERMATOLOGY

## 2022-10-11 PROCEDURE — 3288F PR FALLS RISK ASSESSMENT DOCUMENTED: ICD-10-PCS | Mod: CPTII,S$GLB,, | Performed by: DERMATOLOGY

## 2022-10-11 PROCEDURE — 3008F PR BODY MASS INDEX (BMI) DOCUMENTED: ICD-10-PCS | Mod: CPTII,S$GLB,, | Performed by: DERMATOLOGY

## 2022-10-11 PROCEDURE — 99213 PR OFFICE/OUTPT VISIT, EST, LEVL III, 20-29 MIN: ICD-10-PCS | Mod: S$GLB,,, | Performed by: DERMATOLOGY

## 2022-10-11 PROCEDURE — 99213 OFFICE O/P EST LOW 20 MIN: CPT | Mod: S$GLB,,, | Performed by: DERMATOLOGY

## 2022-10-11 PROCEDURE — 3288F FALL RISK ASSESSMENT DOCD: CPT | Mod: CPTII,S$GLB,, | Performed by: DERMATOLOGY

## 2022-10-11 PROCEDURE — 1126F AMNT PAIN NOTED NONE PRSNT: CPT | Mod: CPTII,S$GLB,, | Performed by: DERMATOLOGY

## 2022-10-11 PROCEDURE — 1159F PR MEDICATION LIST DOCUMENTED IN MEDICAL RECORD: ICD-10-PCS | Mod: CPTII,S$GLB,, | Performed by: DERMATOLOGY

## 2022-10-11 PROCEDURE — 99999 PR PBB SHADOW E&M-EST. PATIENT-LVL III: CPT | Mod: PBBFAC,,, | Performed by: DERMATOLOGY

## 2022-10-11 PROCEDURE — 1160F RVW MEDS BY RX/DR IN RCRD: CPT | Mod: CPTII,S$GLB,, | Performed by: DERMATOLOGY

## 2022-10-11 PROCEDURE — 99999 PR PBB SHADOW E&M-EST. PATIENT-LVL III: ICD-10-PCS | Mod: PBBFAC,,, | Performed by: DERMATOLOGY

## 2022-10-11 PROCEDURE — 1101F PT FALLS ASSESS-DOCD LE1/YR: CPT | Mod: CPTII,S$GLB,, | Performed by: DERMATOLOGY

## 2022-10-11 PROCEDURE — 4010F ACE/ARB THERAPY RXD/TAKEN: CPT | Mod: CPTII,S$GLB,, | Performed by: DERMATOLOGY

## 2022-10-11 PROCEDURE — 3008F BODY MASS INDEX DOCD: CPT | Mod: CPTII,S$GLB,, | Performed by: DERMATOLOGY

## 2022-10-11 NOTE — PROGRESS NOTES
Subjective:       Patient ID:  Elizabeth Lane is a 67 y.o. female who presents for   Chief Complaint   Patient presents with    Skin Check     HPI  Patient present for skin check.     C/o spot between breast X years. Pt just wants reassurance on spot. Tx with chemo cream. Got red. Inflamed   Saw annmarie elias, dx porokeratosis, tx chemo cream      Yes Phx of NMSC.  +SCC- foot  no Fhx of melanoma.    Past Medical History:   Diagnosis Date    Cancer     cervix    Congenital coronary artery anomaly     Hypertension     Liver enzyme elevation     Pure hypercholesterolemia     Scoliosis (and kyphoscoliosis), idiopathic     THORAIC SPINE    Unspecified hypothyroidism        Review of Systems   Constitutional:  Negative for fever, chills and fatigue.   Skin:  Positive for itching, dry skin, daily sunscreen use and activity-related sunscreen use. Negative for rash and wears hat.   Hematologic/Lymphatic: Bruises/bleeds easily.      Objective:    Physical Exam   Constitutional: She appears well-developed and well-nourished.   HENT:   Mouth/Throat: Lips normal.    Eyes: Lids are normal.    Cardiovascular:  There is no local extremity swelling.             Neurological: She is alert and oriented to person, place, and time.   Psychiatric: She has a normal mood and affect.   Skin:   Areas Examined (abnormalities noted in diagram):   Head / Face Inspection Performed  Neck Inspection Performed  Chest / Axilla Inspection Performed  Abdomen Inspection Performed  Back Inspection Performed  RUE Inspected  LUE Inspection Performed  RLE Inspected  LLE Inspection Performed            Diagram Legend     Erythematous scaling macule/papule c/w actinic keratosis       Vascular papule c/w angioma      Pigmented verrucoid papule/plaque c/w seborrheic keratosis      Yellow umbilicated papule c/w sebaceous hyperplasia      Irregularly shaped tan macule c/w lentigo     1-2 mm smooth white papules consistent with Milia      Movable  subcutaneous cyst with punctum c/w epidermal inclusion cyst      Subcutaneous movable cyst c/w pilar cyst      Firm pink to brown papule c/w dermatofibroma      Pedunculated fleshy papule(s) c/w skin tag(s)      Evenly pigmented macule c/w junctional nevus     Mildly variegated pigmented, slightly irregular-bordered macule c/w mildly atypical nevus      Flesh colored to evenly pigmented papule c/w intradermal nevus       Pink pearly papule/plaque c/w basal cell carcinoma      Erythematous hyperkeratotic cursted plaque c/w SCC      Surgical scar with no sign of skin cancer recurrence      Open and closed comedones      Inflammatory papules and pustules      Verrucoid papule consistent consistent with wart     Erythematous eczematous patches and plaques     Dystrophic onycholytic nail with subungual debris c/w onychomycosis     Umbilicated papule    Erythematous-base heme-crusted tan verrucoid plaque consistent with inflamed seborrheic keratosis     Erythematous Silvery Scaling Plaque c/w Psoriasis     See annotation      Assessment / Plan:        Personal history of other malignant neoplasm of skin  Area(s) of previous NMSC evaluated with no signs of recurrence.    Upper body skin examination performed today including at least 6 points as noted in physical examination. No lesions suspicious for malignancy noted.      Skin cancer screening  Upper body skin examination performed today including at least 6 points as noted in physical examination. No lesions suspicious for malignancy noted.    Seborrheic keratoses  Inframammary  These are benign inherited growths without a malignant potential. Reassurance given to patient. No treatment is necessary.            No follow-ups on file.

## 2022-11-18 PROBLEM — N61.1 ABSCESS OF RIGHT BREAST: Status: ACTIVE | Noted: 2022-11-18

## 2023-04-24 ENCOUNTER — TELEPHONE (OUTPATIENT)
Dept: DERMATOLOGY | Facility: CLINIC | Age: 68
End: 2023-04-24
Payer: COMMERCIAL

## 2023-04-24 ENCOUNTER — OFFICE VISIT (OUTPATIENT)
Dept: DERMATOLOGY | Facility: CLINIC | Age: 68
End: 2023-04-24
Payer: COMMERCIAL

## 2023-04-24 DIAGNOSIS — D48.5 NEOPLASM OF UNCERTAIN BEHAVIOR OF SKIN: Primary | ICD-10-CM

## 2023-04-24 PROCEDURE — 11102 TANGNTL BX SKIN SINGLE LES: CPT | Mod: S$GLB,,, | Performed by: DERMATOLOGY

## 2023-04-24 PROCEDURE — 88305 TISSUE EXAM BY PATHOLOGIST: ICD-10-PCS | Mod: 26,,, | Performed by: PATHOLOGY

## 2023-04-24 PROCEDURE — 88305 TISSUE EXAM BY PATHOLOGIST: CPT | Mod: PO | Performed by: PATHOLOGY

## 2023-04-24 PROCEDURE — 88305 TISSUE EXAM BY PATHOLOGIST: CPT | Mod: 26,,, | Performed by: PATHOLOGY

## 2023-04-24 PROCEDURE — 99499 UNLISTED E&M SERVICE: CPT | Mod: S$GLB,,, | Performed by: DERMATOLOGY

## 2023-04-24 PROCEDURE — 1159F MED LIST DOCD IN RCRD: CPT | Mod: CPTII,S$GLB,, | Performed by: DERMATOLOGY

## 2023-04-24 PROCEDURE — 99999 PR PBB SHADOW E&M-EST. PATIENT-LVL III: CPT | Mod: PBBFAC,,, | Performed by: DERMATOLOGY

## 2023-04-24 PROCEDURE — 1125F AMNT PAIN NOTED PAIN PRSNT: CPT | Mod: CPTII,S$GLB,, | Performed by: DERMATOLOGY

## 2023-04-24 PROCEDURE — 1125F PR PAIN SEVERITY QUANTIFIED, PAIN PRESENT: ICD-10-PCS | Mod: CPTII,S$GLB,, | Performed by: DERMATOLOGY

## 2023-04-24 PROCEDURE — 99499 NO LOS: ICD-10-PCS | Mod: S$GLB,,, | Performed by: DERMATOLOGY

## 2023-04-24 PROCEDURE — 4010F PR ACE/ARB THEARPY RXD/TAKEN: ICD-10-PCS | Mod: CPTII,S$GLB,, | Performed by: DERMATOLOGY

## 2023-04-24 PROCEDURE — 1159F PR MEDICATION LIST DOCUMENTED IN MEDICAL RECORD: ICD-10-PCS | Mod: CPTII,S$GLB,, | Performed by: DERMATOLOGY

## 2023-04-24 PROCEDURE — 4010F ACE/ARB THERAPY RXD/TAKEN: CPT | Mod: CPTII,S$GLB,, | Performed by: DERMATOLOGY

## 2023-04-24 PROCEDURE — 99999 PR PBB SHADOW E&M-EST. PATIENT-LVL III: ICD-10-PCS | Mod: PBBFAC,,, | Performed by: DERMATOLOGY

## 2023-04-24 PROCEDURE — 11102 PR TANGENTIAL BIOPSY, SKIN, SINGLE LESION: ICD-10-PCS | Mod: S$GLB,,, | Performed by: DERMATOLOGY

## 2023-04-24 NOTE — TELEPHONE ENCOUNTER
----- Message from Rhonda Norman sent at 4/24/2023  9:08 AM CDT -----  Contact: self  Type:  Sooner Appointment Request    Caller is requesting a sooner appointment.  Caller declined first available appointment listed below.  Caller will not accept being placed on the waitlist and is requesting a message be sent to doctor.  Name of Caller:Pt  When is the first available appointment?n/a  Symptoms: liason on face (hard bump w/pain)  Would the patient rather a call back or a response via [facility]? call  Best Call Back Number:[phone]    Additional Information: Please call pt to advise/schedule... Thank you...

## 2023-04-24 NOTE — PROGRESS NOTES
Subjective:      Patient ID:  Elizabeth Lane is a 67 y.o. female who presents for No chief complaint on file.    HPI    Established patient.  PT of LT, [facility] - add on today for new lesion at cheek.   Hx of NMSC (SCC- foot), DSAP.     Review of Systems    Objective:   Physical Exam   Constitutional: She appears well-developed and well-nourished. No distress.   Neurological: She is alert and oriented to person, place, and time. She is not disoriented.   Psychiatric: She has a normal mood and affect.   Skin:             Diagram Legend     Erythematous scaling macule/papule c/w actinic keratosis       Vascular papule c/w angioma      Pigmented verrucoid papule/plaque c/w seborrheic keratosis      Yellow umbilicated papule c/w sebaceous hyperplasia      Irregularly shaped tan macule c/w lentigo     1-2 mm smooth white papules consistent with Milia      Movable subcutaneous cyst with punctum c/w epidermal inclusion cyst      Subcutaneous movable cyst c/w pilar cyst      Firm pink to brown papule c/w dermatofibroma      Pedunculated fleshy papule(s) c/w skin tag(s)      Evenly pigmented macule c/w junctional nevus     Mildly variegated pigmented, slightly irregular-bordered macule c/w mildly atypical nevus      Flesh colored to evenly pigmented papule c/w intradermal nevus       Pink pearly papule/plaque c/w basal cell carcinoma      Erythematous hyperkeratotic cursted plaque c/w SCC      Surgical scar with no sign of skin cancer recurrence      Open and closed comedones      Inflammatory papules and pustules      Verrucoid papule consistent consistent with wart     Erythematous eczematous patches and plaques     Dystrophic onycholytic nail with subungual debris c/w onychomycosis     Umbilicated papule    Erythematous-base heme-crusted tan verrucoid plaque consistent with inflamed seborrheic keratosis     Erythematous Silvery Scaling Plaque c/w Psoriasis     See annotation              Assessment / Plan:      Pathology  Orders:       Normal Orders This Visit    Specimen to Pathology, Dermatology     Comments:    Number of Specimens:->1  ------------------------->-------------------------  Spec 1 Procedure:->Biopsy  Spec 1 Clinical Impression:->r/o nmsc vs inflamed cyst  Spec 1 Source:->left nasojugal fold  Release to patient->Immediate    Questions:    Procedure Type: Dermatology and skin neoplasms    Number of Specimens: 1    ------------------------: -------------------------    Spec 1 Procedure: Biopsy    Spec 1 Clinical Impression: r/o nmsc vs inflamed cyst    Spec 1 Source: left nasojugal fold    Release to patient: Immediate          Neoplasm of uncertain behavior of skin  -     Specimen to Pathology, Dermatology    - Discussed diagnosis with patient and explained uncertain nature of condition, including differential DDX.   - Discussed treatment options (biopsy, close monitoring) with patient, including the risks and benefits of each. Patient opted to pursue biopsy.  - Shave Biopsy Procedure Note: Discussed procedure with patient/patient's guardian including risks and benefits as well as treatment alternatives. Risks of procedure include pain, bleeding, infection, post-inflammatory pigmentary alteration, scar, recurrence. Patient informed that the purpose of a biopsy is sampling of condition in question rather than removal in entirety; further treatment may be necessary. Verbal consent obtained. Area to be biopsied marked and cleansed with alcohol. Local anesthesia achieved by injecting approximately 1 cc of 1% lidocaine with epinephrine. One shave biopsy performed using a double edge razor blade; specimen submitted to pathology. Hemostasis achieved with aluminum chloride. Petroleum jelly and bandage applied to wound. Patient tolerated procedure well. After-visit wound care instructions reviewed and provided in writing.            Follow up for pending pathology.

## 2023-04-27 NOTE — PATIENT INSTRUCTIONS
Shave Biopsy Wound Care    Your doctor has performed a shave biopsy today.  A band aid and vaseline ointment has been placed over the site.  This should remain in place for NO LONGER THAN 48 hours.  It is fine to remove the bandaid after 24 hours, if the area is no longer bleeding. It is recommended that you keep the area dry (do not wet)) for the first 24 hours.  After 24 hours, wash the area with warm soap and water and apply Vaseline jelly.  Many patients prefer to use Neosporin or Bacitracin ointment.  This is acceptable; however, know that you can develop an allergy to this medication even if you have used it safely for years.  It is important to keep the area moist.  Letting it dry out and get air slows healing time, and will worsen the scar.        If you notice increasing redness, tenderness, pain, or yellow drainage at the biopsy site, please notify your doctor.  These are signs of an infection.    If your biopsy site is bleeding, apply firm pressure for 15 minutes straight.  Repeat for another 15 minutes, if it is still bleeding.   If the surgical site continues to bleed, then please contact your doctor.      For [facility] users:   You will receive your biopsy results in [facility] as soon as they are available. Please be assured that your physician/provider will review your results and will then determine what further treatment, evaluation, or planning is required. You should be contacted by your physician's/provider's office within 5 business days of receiving your results; If not, please reach out to directly. This is one more way [employer] [facility].     [facility] [place] [address]/ [phone] FAX/ [email or web address]

## 2023-05-03 LAB
FINAL PATHOLOGIC DIAGNOSIS: NORMAL
GROSS: NORMAL
Lab: NORMAL
MICROSCOPIC EXAM: NORMAL

## 2023-05-03 NOTE — PROGRESS NOTES
Skin, left nasojugal fold, shave biopsy:   -SQUAMOUS CELL CARCINOMA, EXTENDING TO THE DEEP BIOPSY EDGE     Please call to discuss results / plan / schedule:  SCC, recommend Mohs with [facility], please call patient to discuss results and execute external referral.   Thanks!

## 2023-05-10 ENCOUNTER — TELEPHONE (OUTPATIENT)
Dept: DERMATOLOGY | Facility: CLINIC | Age: 68
End: 2023-05-10
Payer: COMMERCIAL

## 2023-05-10 NOTE — TELEPHONE ENCOUNTER
----- Message from Patricia Ochoaclaudia sent at 5/10/2023 10:30 AM CDT -----  Type: Needs Medical Advice  Who Called:  pt  Best Call Back Number: [phone]  Additional Information: pt is calling the office to speak with Mely in regards to her recent diagnosis and the f/u appt she is supposed to have. They are not able to see her until 6/30/23 and she wants to be seen sooner. Please call back to advise. Thanks!

## 2023-05-11 NOTE — TELEPHONE ENCOUNTER
Nadia add to MOHS list, prioritize for within the first 3 weeks. Pt to have this procedure done. SCC- left nasojugal fold

## 2023-05-19 ENCOUNTER — TELEPHONE (OUTPATIENT)
Dept: DERMATOLOGY | Facility: CLINIC | Age: 68
End: 2023-05-19
Payer: COMMERCIAL

## 2023-05-31 ENCOUNTER — PROCEDURE VISIT (OUTPATIENT)
Dept: DERMATOLOGY | Facility: CLINIC | Age: 68
End: 2023-05-31
Payer: COMMERCIAL

## 2023-05-31 VITALS
WEIGHT: 200 LBS | BODY MASS INDEX: 32.14 KG/M2 | SYSTOLIC BLOOD PRESSURE: 147 MMHG | DIASTOLIC BLOOD PRESSURE: 74 MMHG | HEIGHT: 66 IN | HEART RATE: 76 BPM

## 2023-05-31 DIAGNOSIS — C44.320 SCC (SQUAMOUS CELL CARCINOMA), FACE: Primary | ICD-10-CM

## 2023-05-31 PROCEDURE — 13131 CMPLX RPR F/C/C/M/N/AX/G/H/F: CPT | Mod: 51,S$GLB,, | Performed by: DERMATOLOGY

## 2023-05-31 PROCEDURE — 17312: ICD-10-PCS | Mod: S$GLB,,, | Performed by: DERMATOLOGY

## 2023-05-31 PROCEDURE — 17311: ICD-10-PCS | Mod: S$GLB,,, | Performed by: DERMATOLOGY

## 2023-05-31 PROCEDURE — 13131 PR RECMPL WND HEAD,FAC,HAND 1.1-2.5 CM: ICD-10-PCS | Mod: 51,S$GLB,, | Performed by: DERMATOLOGY

## 2023-05-31 PROCEDURE — 17312 MOHS ADDL STAGE: CPT | Mod: S$GLB,,, | Performed by: DERMATOLOGY

## 2023-05-31 PROCEDURE — 17311 MOHS 1 STAGE H/N/HF/G: CPT | Mod: S$GLB,,, | Performed by: DERMATOLOGY

## 2023-05-31 NOTE — PROGRESS NOTES
MOHS MICROGRAPHIC SURGERY OPERATIVE NOTE  Name: Elizabeth Lane   Date: 2023  Patient : 1955    Attending Surgeon: Edward Spivey MD  Assistants: Susanne Thomson  Anesthetic Agent: 1% lidocaine with 1:100,000 epinephrine and 0.25% bupivacaine with 1:200,000 epinephrine  Clinical Diagnosis: squamous cell carcinoma - NOS  Location: Left nasojugal fold (clinically lesion is on the left infraorbital cheek)  Operation: Mohs Micrographic Surgery  Indications: Aesthetically Sensitive Area, High Risk Tumor Location, Immunocompromised patient, and No Laxity For Wide Excision   Surgical Preparation: povidone-iodine  Description of Operation:  The nature and purpose of the procedure, associated risks and alternative treatments were explained to the patient in detail. All patient questions were answered completely. An informed operative consent and photography permit were obtained. The tumor location was then identified and marked with agreement by the patient of the correct location. The patient was positioned, prepped, and draped in the usual sterile manner. Local anesthesia was obtained with 1 cc of 1% lidocaine and 1 cc of 0.25% bupivacaine with 1:200,000 epinephrine. The lesion pre-operatively measures 0.6 by 0.5 cm.    STAGE I:  A 2+ mm rim of normal appearing skin was marked circumferentially around the lesion after scraping with a curette to define the margin. The area thus outlined was excised at a 45 degree angle. Hemostasis was obtained with electrodesiccation. The specimen was oriented, mapped, and subdivided into at least two sections. Sections were then chromacoded and submitted for horizontal frozen sections. The patient tolerated the procedure well and there were no complications. Upon microscopic examination of the processed horizontal frozen sections of this stage:  Dense inflammation and scar tissue obscured margin at the 4'o clock to 6' o clock area, necessitating an additional stage to  rule out residual tumor    SUBSEQUENT STAGES  The patient returned to the operating room for additional stages of tumor removal, and prepped in the manner described above. Surgery was directed to the areas having residual tumor, with thin layers of tissue being excised from these regions. A new reference map was prepared during the surgery to maintain precise orientation as described above. Hemostasis was achieved and the excised tissue was processed for microscopic analysis. These sections were then examined by the Mohs surgeon, and areas of persistent tumor were indicated on the reference map. This process was repeated until the frozen horizontal sections of STAGE 2 revealed no further tumor cells and tumor eradication was considered to be  complete. The patient tolerated the procedure well and no complications were noted.    SUMMARY:  The tumor was extirpated in 2 Mohs Stages resulting in a final defect measuring 0.8 by 0.8 cm².   The final defect extended deep to subcutaneous fat  See repair note for details on repair      Edward Spivey MD

## 2023-05-31 NOTE — PROGRESS NOTES
Complex Linear Closure Operative Note  Name: Elizabeth Lane  YOB: 1955  Date: 5/31/2023  Attending Surgeon: Edward Spivey MD FAAD  Assistants: Vickey Butler and Susanne Thomson  Clinical Diagnosis: A 0.8 by 0.8 cm² defect secondary to Mohs Micrographic Surgery  Location: left nasojugal fold (clinically lesion is on the left infraorbital cheek)  Operation: Complex linear closure  Surgical Preparation: povidone-iodine  Description of Operation:  The nature and purpose of the procedure, associated risks and alternative treatments were explained to the patient in detail. All patient questions were answered completely. In order to minimize tension on the closure and avoid compromising the anatomic contour of the cosmetic region and maximize functional capacity, a complex linear closure was performed. An informed operative consent and photography permit were obtained. The patient was positioned, prepped, and draped in the usual sterile manner. Local anesthesia was obtained with 4 cc(s) of 0.25% bupivacaine with 1:200,000 epinephrine.    The defect edges were debeveled with a #15 scalpel blade. The circular defect was widely undermined in the deep subcutaneous plane at least 100% of the defect diameter to allow for maximum  tissue movement. Hemostasis was achieved with spot electrodessication. The defect was closed first utilizing multiple 4-0 Vicryl interrupted buried subcutaneous sutures. This resulted in excellent apposition of the dermis and epidermis, however two redundant areas of tissue were created on opposite sides of the defect. Utilizing a #15 scalpel blade, two Burows triangles were excised to ensure a flat scar. Hemostasis was obtained with spot electrodessication. The skin edges throughout further fastened superficially with 5-0 Prolene. The final length of the repair was 2.4 cm.  The patient tolerated the procedure well and there were no complications.  Polysporin, Telfa and a pressure  dressing were applied to area.  Post op meds: None  Patient to follow up in 7 days for suture removal.        Edward Spivey MD

## 2023-06-01 ENCOUNTER — TELEPHONE (OUTPATIENT)
Dept: DERMATOLOGY | Facility: CLINIC | Age: 68
End: 2023-06-01
Payer: COMMERCIAL

## 2023-06-01 NOTE — TELEPHONE ENCOUNTER
----- Message from Guadalupe Lewis sent at 6/1/2023 11:08 AM CDT -----  Contact: pt  Type: Needs Medical Advice  Who Called:  pt     Best Call Back Number: [phone]    Additional Information: pt states she needs a work release form faxed over to [facility] [phone]. Please advise.

## 2023-06-07 ENCOUNTER — OFFICE VISIT (OUTPATIENT)
Dept: DERMATOLOGY | Facility: CLINIC | Age: 68
End: 2023-06-07
Payer: COMMERCIAL

## 2023-06-07 DIAGNOSIS — Z48.02 VISIT FOR SUTURE REMOVAL: Primary | ICD-10-CM

## 2023-06-07 PROCEDURE — 99024 POSTOP FOLLOW-UP VISIT: CPT | Mod: S$GLB,,, | Performed by: DERMATOLOGY

## 2023-06-07 PROCEDURE — 1160F RVW MEDS BY RX/DR IN RCRD: CPT | Mod: CPTII,S$GLB,, | Performed by: DERMATOLOGY

## 2023-06-07 PROCEDURE — 4010F ACE/ARB THERAPY RXD/TAKEN: CPT | Mod: CPTII,S$GLB,, | Performed by: DERMATOLOGY

## 2023-06-07 PROCEDURE — 1160F PR REVIEW ALL MEDS BY PRESCRIBER/CLIN PHARMACIST DOCUMENTED: ICD-10-PCS | Mod: CPTII,S$GLB,, | Performed by: DERMATOLOGY

## 2023-06-07 PROCEDURE — 1126F PR PAIN SEVERITY QUANTIFIED, NO PAIN PRESENT: ICD-10-PCS | Mod: CPTII,S$GLB,, | Performed by: DERMATOLOGY

## 2023-06-07 PROCEDURE — 1101F PT FALLS ASSESS-DOCD LE1/YR: CPT | Mod: CPTII,S$GLB,, | Performed by: DERMATOLOGY

## 2023-06-07 PROCEDURE — 3288F FALL RISK ASSESSMENT DOCD: CPT | Mod: CPTII,S$GLB,, | Performed by: DERMATOLOGY

## 2023-06-07 PROCEDURE — 99999 PR PBB SHADOW E&M-EST. PATIENT-LVL III: CPT | Mod: PBBFAC,,, | Performed by: DERMATOLOGY

## 2023-06-07 PROCEDURE — 99999 PR PBB SHADOW E&M-EST. PATIENT-LVL III: ICD-10-PCS | Mod: PBBFAC,,, | Performed by: DERMATOLOGY

## 2023-06-07 PROCEDURE — 1159F PR MEDICATION LIST DOCUMENTED IN MEDICAL RECORD: ICD-10-PCS | Mod: CPTII,S$GLB,, | Performed by: DERMATOLOGY

## 2023-06-07 PROCEDURE — 3288F PR FALLS RISK ASSESSMENT DOCUMENTED: ICD-10-PCS | Mod: CPTII,S$GLB,, | Performed by: DERMATOLOGY

## 2023-06-07 PROCEDURE — 1159F MED LIST DOCD IN RCRD: CPT | Mod: CPTII,S$GLB,, | Performed by: DERMATOLOGY

## 2023-06-07 PROCEDURE — 99024 PR POST-OP FOLLOW-UP VISIT: ICD-10-PCS | Mod: S$GLB,,, | Performed by: DERMATOLOGY

## 2023-06-07 PROCEDURE — 4010F PR ACE/ARB THEARPY RXD/TAKEN: ICD-10-PCS | Mod: CPTII,S$GLB,, | Performed by: DERMATOLOGY

## 2023-06-07 PROCEDURE — 1126F AMNT PAIN NOTED NONE PRSNT: CPT | Mod: CPTII,S$GLB,, | Performed by: DERMATOLOGY

## 2023-06-07 PROCEDURE — 1101F PR PT FALLS ASSESS DOC 0-1 FALLS W/OUT INJ PAST YR: ICD-10-PCS | Mod: CPTII,S$GLB,, | Performed by: DERMATOLOGY

## 2023-06-07 NOTE — PATIENT INSTRUCTIONS
Silicone Scar Treatment    Silicone gel sheeting is a simple and inexpensive treatment that has been clinically proven to aid in superficial wound healing following surgical procedures and can even improve the appearance of older scars.   The consistent use of silicone gel sheets helps to provide the best possible cosmetic outcome post-operatively.     How to use Silicone Gel Sheets:  Any brand is fine, generic drugstore brands should be equally effective. They are also available on [employer]  Cut the Silicone gel sheet to a size slightly larger than the scar and apply nightly to the affected area.   The sheets can also be worn during the daytime, if desired  Remove sheet in the AM and rinse it off in the sink, the sheets can be re-used for up to 3 weeks  Paper tape can be used to keep sheets in place while sleeping, if necessary  Sheeting should be used for at least 8 weeks for maximum benefit    Silicone Gels:  Silicone gels provide the benefits of silicone without the need for a visible bandage during the day  They dry quickly (within 5-10 minutes) and provide an invisible barrier that can last all day  Over the counter brands include Biocorneum or ScarAway

## 2023-06-07 NOTE — PROGRESS NOTES
Mohs Surgery Suture Removal Note     Wound well healed no evidence of any complications noted. Patient does report some swelling and tenderness, no e/o infection on exam.   Patient handout on silicone scar gels/sheets provided.  Follow up in approximately 3 months for post surgical check up with referring provider.

## 2023-08-08 ENCOUNTER — OFFICE VISIT (OUTPATIENT)
Dept: DERMATOLOGY | Facility: CLINIC | Age: 68
End: 2023-08-08
Payer: COMMERCIAL

## 2023-08-08 VITALS — WEIGHT: 200 LBS | BODY MASS INDEX: 32.14 KG/M2 | HEIGHT: 66 IN

## 2023-08-08 DIAGNOSIS — L90.5 SCAR: Primary | ICD-10-CM

## 2023-08-08 DIAGNOSIS — L72.0 MILIA: ICD-10-CM

## 2023-08-08 DIAGNOSIS — Z85.828 PERSONAL HISTORY OF OTHER MALIGNANT NEOPLASM OF SKIN: ICD-10-CM

## 2023-08-08 PROCEDURE — 1126F PR PAIN SEVERITY QUANTIFIED, NO PAIN PRESENT: ICD-10-PCS | Mod: CPTII,S$GLB,, | Performed by: DERMATOLOGY

## 2023-08-08 PROCEDURE — 1160F RVW MEDS BY RX/DR IN RCRD: CPT | Mod: CPTII,S$GLB,, | Performed by: DERMATOLOGY

## 2023-08-08 PROCEDURE — 4010F PR ACE/ARB THEARPY RXD/TAKEN: ICD-10-PCS | Mod: CPTII,S$GLB,, | Performed by: DERMATOLOGY

## 2023-08-08 PROCEDURE — 99999 PR PBB SHADOW E&M-EST. PATIENT-LVL III: CPT | Mod: PBBFAC,,, | Performed by: DERMATOLOGY

## 2023-08-08 PROCEDURE — 3288F PR FALLS RISK ASSESSMENT DOCUMENTED: ICD-10-PCS | Mod: CPTII,S$GLB,, | Performed by: DERMATOLOGY

## 2023-08-08 PROCEDURE — 3288F FALL RISK ASSESSMENT DOCD: CPT | Mod: CPTII,S$GLB,, | Performed by: DERMATOLOGY

## 2023-08-08 PROCEDURE — 1101F PR PT FALLS ASSESS DOC 0-1 FALLS W/OUT INJ PAST YR: ICD-10-PCS | Mod: CPTII,S$GLB,, | Performed by: DERMATOLOGY

## 2023-08-08 PROCEDURE — 99213 PR OFFICE/OUTPT VISIT, EST, LEVL III, 20-29 MIN: ICD-10-PCS | Mod: S$GLB,,, | Performed by: DERMATOLOGY

## 2023-08-08 PROCEDURE — 1160F PR REVIEW ALL MEDS BY PRESCRIBER/CLIN PHARMACIST DOCUMENTED: ICD-10-PCS | Mod: CPTII,S$GLB,, | Performed by: DERMATOLOGY

## 2023-08-08 PROCEDURE — 1159F MED LIST DOCD IN RCRD: CPT | Mod: CPTII,S$GLB,, | Performed by: DERMATOLOGY

## 2023-08-08 PROCEDURE — 99213 OFFICE O/P EST LOW 20 MIN: CPT | Mod: S$GLB,,, | Performed by: DERMATOLOGY

## 2023-08-08 PROCEDURE — 1101F PT FALLS ASSESS-DOCD LE1/YR: CPT | Mod: CPTII,S$GLB,, | Performed by: DERMATOLOGY

## 2023-08-08 PROCEDURE — 4010F ACE/ARB THERAPY RXD/TAKEN: CPT | Mod: CPTII,S$GLB,, | Performed by: DERMATOLOGY

## 2023-08-08 PROCEDURE — 1126F AMNT PAIN NOTED NONE PRSNT: CPT | Mod: CPTII,S$GLB,, | Performed by: DERMATOLOGY

## 2023-08-08 PROCEDURE — 3008F PR BODY MASS INDEX (BMI) DOCUMENTED: ICD-10-PCS | Mod: CPTII,S$GLB,, | Performed by: DERMATOLOGY

## 2023-08-08 PROCEDURE — 3008F BODY MASS INDEX DOCD: CPT | Mod: CPTII,S$GLB,, | Performed by: DERMATOLOGY

## 2023-08-08 PROCEDURE — 1159F PR MEDICATION LIST DOCUMENTED IN MEDICAL RECORD: ICD-10-PCS | Mod: CPTII,S$GLB,, | Performed by: DERMATOLOGY

## 2023-08-08 PROCEDURE — 99999 PR PBB SHADOW E&M-EST. PATIENT-LVL III: ICD-10-PCS | Mod: PBBFAC,,, | Performed by: DERMATOLOGY

## 2023-08-08 NOTE — PROGRESS NOTES
Subjective:      Patient ID:  Elizabeth Lane is a 67 y.o. female who presents for   Chief Complaint   Patient presents with    Skin Check     TBSC     HPI  Established Patient for TBSC  Has personal h/x of skin cancer   S/p mohs Dr. COOK- scc left cheek biopsied by HIRAM  Yes Phx of NMSC.  +SCC- foot and left cheek  no Fhx of melanoma.  Review of Systems   Constitutional:  Negative for fever, chills and fatigue.   Skin:  Positive for itching, dry skin, daily sunscreen use and activity-related sunscreen use. Negative for rash and wears hat.   Hematologic/Lymphatic: Bruises/bleeds easily.       Objective:   Physical Exam   Constitutional: She appears well-developed and well-nourished. No distress.   HENT:   Mouth/Throat: Lips normal.    Eyes: Lids are normal.    Cardiovascular:  There is no local extremity swelling.             Neurological: She is alert and oriented to person, place, and time. She is not disoriented.   Psychiatric: She has a normal mood and affect.   Skin:   Areas Examined (abnormalities noted in diagram):   Head / Face Inspection Performed  Neck Inspection Performed  Chest / Axilla Inspection Performed  Abdomen Inspection Performed  Back Inspection Performed  RUE Inspected  LUE Inspection Performed  RLE Inspected  LLE Inspection Performed                 Diagram Legend     Erythematous scaling macule/papule c/w actinic keratosis       Vascular papule c/w angioma      Pigmented verrucoid papule/plaque c/w seborrheic keratosis      Yellow umbilicated papule c/w sebaceous hyperplasia      Irregularly shaped tan macule c/w lentigo     1-2 mm smooth white papules consistent with Milia      Movable subcutaneous cyst with punctum c/w epidermal inclusion cyst      Subcutaneous movable cyst c/w pilar cyst      Firm pink to brown papule c/w dermatofibroma      Pedunculated fleshy papule(s) c/w skin tag(s)      Evenly pigmented macule c/w junctional nevus     Mildly variegated pigmented, slightly  irregular-bordered macule c/w mildly atypical nevus      Flesh colored to evenly pigmented papule c/w intradermal nevus       Pink pearly papule/plaque c/w basal cell carcinoma      Erythematous hyperkeratotic cursted plaque c/w SCC      Surgical scar with no sign of skin cancer recurrence      Open and closed comedones      Inflammatory papules and pustules      Verrucoid papule consistent consistent with wart     Erythematous eczematous patches and plaques     Dystrophic onycholytic nail with subungual debris c/w onychomycosis     Umbilicated papule    Erythematous-base heme-crusted tan verrucoid plaque consistent with inflamed seborrheic keratosis     Erythematous Silvery Scaling Plaque c/w Psoriasis     See annotation      Assessment / Plan:        Scar  Left cheek s/p mohs  Reassurance     Personal history of other malignant neoplasm of skin  Area(s) of previous NMSC evaluated with no signs of recurrence.    Upper body skin examination performed today including at least 6 points as noted in physical examination. No lesions suspicious for malignancy noted.      Milia  Left cheek near scar  Lesion incised with #11 blade and drained on today's date             No follow-ups on file.

## 2024-02-20 ENCOUNTER — OFFICE VISIT (OUTPATIENT)
Dept: DERMATOLOGY | Facility: CLINIC | Age: 69
End: 2024-02-20
Payer: MEDICARE

## 2024-02-20 DIAGNOSIS — Z85.828 PERSONAL HISTORY OF OTHER MALIGNANT NEOPLASM OF SKIN: ICD-10-CM

## 2024-02-20 DIAGNOSIS — L81.4 SOLAR LENTIGINOSIS: ICD-10-CM

## 2024-02-20 DIAGNOSIS — L90.5 SCAR: Primary | ICD-10-CM

## 2024-02-20 PROCEDURE — 1159F MED LIST DOCD IN RCRD: CPT | Mod: CPTII,S$GLB,, | Performed by: DERMATOLOGY

## 2024-02-20 PROCEDURE — 1101F PT FALLS ASSESS-DOCD LE1/YR: CPT | Mod: CPTII,S$GLB,, | Performed by: DERMATOLOGY

## 2024-02-20 PROCEDURE — 99999 PR PBB SHADOW E&M-EST. PATIENT-LVL III: CPT | Mod: PBBFAC,,, | Performed by: DERMATOLOGY

## 2024-02-20 PROCEDURE — 99213 OFFICE O/P EST LOW 20 MIN: CPT | Mod: S$GLB,,, | Performed by: DERMATOLOGY

## 2024-02-20 PROCEDURE — 3288F FALL RISK ASSESSMENT DOCD: CPT | Mod: CPTII,S$GLB,, | Performed by: DERMATOLOGY

## 2024-02-20 PROCEDURE — 1160F RVW MEDS BY RX/DR IN RCRD: CPT | Mod: CPTII,S$GLB,, | Performed by: DERMATOLOGY

## 2024-02-20 NOTE — PROGRESS NOTES
Subjective:      Patient ID:  Elizabeth Lane is a 68 y.o. female who presents for   Chief Complaint   Patient presents with    Skin Check     TBSE.  C/O red spot at left lower leg x 3-5 months     HPI  Established Patient for [facility]  Has personal h/x of skin cancer   S/p mohs Dr. COOK- scc left cheek biopsied by [facility]  Yes Phx of NMSC.  +SCC- foot and left cheek  no Fhx of melanoma.    Review of Systems  Constitutional:  Negative for fever, chills and fatigue.   Skin:  Positive for itching, dry skin, daily sunscreen use and activity-related sunscreen use. Negative for rash and wears hat.   Hematologic/Lymphatic: Bruises/bleeds easily.     Objective:   Physical Exam   Constitutional: She appears well-developed and well-nourished. No distress.   HENT:   Mouth/Throat: Lips normal.    Eyes: Lids are normal.    Cardiovascular:  There is no local extremity swelling.             Neurological: She is alert and oriented to person, place, and time. She is not disoriented.   Psychiatric: She has a normal mood and affect.   Skin:   Areas Examined (abnormalities noted in diagram):   Head / Face Inspection Performed  Neck Inspection Performed  Chest / Axilla Inspection Performed  Abdomen Inspection Performed  Back Inspection Performed  RUE Inspected  LUE Inspection Performed  RLE Inspected  LLE Inspection Performed                 Diagram Legend     Erythematous scaling macule/papule c/w actinic keratosis       Vascular papule c/w angioma      Pigmented verrucoid papule/plaque c/w seborrheic keratosis      Yellow umbilicated papule c/w sebaceous hyperplasia      Irregularly shaped tan macule c/w lentigo     1-2 mm smooth white papules consistent with Milia      Movable subcutaneous cyst with punctum c/w epidermal inclusion cyst      Subcutaneous movable cyst c/w pilar cyst      Firm pink to brown papule c/w dermatofibroma      Pedunculated fleshy papule(s) c/w skin tag(s)      Evenly pigmented macule c/w junctional nevus      Mildly variegated pigmented, slightly irregular-bordered macule c/w mildly atypical nevus      Flesh colored to evenly pigmented papule c/w intradermal nevus       Pink pearly papule/plaque c/w basal cell carcinoma      Erythematous hyperkeratotic cursted plaque c/w SCC      Surgical scar with no sign of skin cancer recurrence      Open and closed comedones      Inflammatory papules and pustules      Verrucoid papule consistent consistent with wart     Erythematous eczematous patches and plaques     Dystrophic onycholytic nail with subungual debris c/w onychomycosis     Umbilicated papule    Erythematous-base heme-crusted tan verrucoid plaque consistent with inflamed seborrheic keratosis     Erythematous Silvery Scaling Plaque c/w Psoriasis     See annotation      Assessment / Plan:        Scar  NER NMSC    Personal history of other malignant neoplasm of skin  Area(s) of previous NMSC evaluated with no signs of recurrence.    Upper body skin examination performed today including at least 6 points as noted in physical examination. No lesions suspicious for malignancy noted.      Solar lentiginosis  trunk and extremities  This is a benign hyperpigmented sun induced lesion. Daily sun protection will reduce the number of new lesions. Treatment of these benign lesions are considered cosmetic.               No follow-ups on file.

## 2024-10-04 ENCOUNTER — TELEPHONE (OUTPATIENT)
Dept: DERMATOLOGY | Facility: CLINIC | Age: 69
End: 2024-10-04
Payer: MEDICARE

## 2024-10-08 ENCOUNTER — OFFICE VISIT (OUTPATIENT)
Facility: CLINIC | Age: 69
End: 2024-10-08
Payer: MEDICARE

## 2024-10-08 DIAGNOSIS — Z85.828 PERSONAL HISTORY OF OTHER MALIGNANT NEOPLASM OF SKIN: ICD-10-CM

## 2024-10-08 DIAGNOSIS — L90.5 SCAR: Primary | ICD-10-CM

## 2024-10-08 DIAGNOSIS — L57.0 ACTINIC KERATOSES: ICD-10-CM

## 2024-10-08 DIAGNOSIS — Q82.8 POROKERATOSIS: ICD-10-CM

## 2024-10-08 DIAGNOSIS — Z12.83 SKIN CANCER SCREENING: ICD-10-CM

## 2024-10-08 PROCEDURE — 17000 DESTRUCT PREMALG LESION: CPT | Mod: S$GLB,,, | Performed by: DERMATOLOGY

## 2024-10-08 PROCEDURE — 99999 PR PBB SHADOW E&M-EST. PATIENT-LVL II: CPT | Mod: PBBFAC,,, | Performed by: DERMATOLOGY

## 2024-10-08 PROCEDURE — 3288F FALL RISK ASSESSMENT DOCD: CPT | Mod: CPTII,S$GLB,, | Performed by: DERMATOLOGY

## 2024-10-08 PROCEDURE — 17003 DESTRUCT PREMALG LES 2-14: CPT | Mod: S$GLB,,, | Performed by: DERMATOLOGY

## 2024-10-08 PROCEDURE — 1101F PT FALLS ASSESS-DOCD LE1/YR: CPT | Mod: CPTII,S$GLB,, | Performed by: DERMATOLOGY

## 2024-10-08 PROCEDURE — 1159F MED LIST DOCD IN RCRD: CPT | Mod: CPTII,S$GLB,, | Performed by: DERMATOLOGY

## 2024-10-08 PROCEDURE — 99213 OFFICE O/P EST LOW 20 MIN: CPT | Mod: 25,S$GLB,, | Performed by: DERMATOLOGY

## 2024-10-08 PROCEDURE — 4010F ACE/ARB THERAPY RXD/TAKEN: CPT | Mod: CPTII,S$GLB,, | Performed by: DERMATOLOGY

## 2024-10-08 NOTE — PROGRESS NOTES
Subjective:      Patient ID:  Elizabeth Lane is a 69 y.o. female who presents for   Chief Complaint   Patient presents with    Follow-up     Skin check      HPI  Established Patient for [facility]  LOV 2/24  Has personal h/x of skin cancer   S/p mohs Dr. COOK- scc left cheek biopsied by [facility]  Yes Phx of NMSC.  +SCC- foot and left cheek  no Fhx of melanoma.     Review of Systems  Constitutional:  Negative for fever, chills and fatigue.   Skin:  Positive for itching, dry skin, daily sunscreen use and activity-related sunscreen use. Negative for rash and wears hat.   Hematologic/Lymphatic: Bruises/bleeds easily.         Objective:   Physical Exam   Constitutional: She appears well-developed and well-nourished. No distress.   HENT:   Head:       Mouth/Throat: Lips normal.    Eyes: Lids are normal.    Cardiovascular:  There is no local extremity swelling.             Neurological: She is alert and oriented to person, place, and time. She is not disoriented.   Psychiatric: She has a normal mood and affect.   Skin:   Areas Examined (abnormalities noted in diagram):   Head / Face Inspection Performed  Neck Inspection Performed  Chest / Axilla Inspection Performed  Abdomen Inspection Performed  Back Inspection Performed  RUE Inspected  LUE Inspection Performed  RLE Inspected  LLE Inspection Performed                 Diagram Legend     Erythematous scaling macule/papule c/w actinic keratosis       Vascular papule c/w angioma      Pigmented verrucoid papule/plaque c/w seborrheic keratosis      Yellow umbilicated papule c/w sebaceous hyperplasia      Irregularly shaped tan macule c/w lentigo     1-2 mm smooth white papules consistent with Milia      Movable subcutaneous cyst with punctum c/w epidermal inclusion cyst      Subcutaneous movable cyst c/w pilar cyst      Firm pink to brown papule c/w dermatofibroma      Pedunculated fleshy papule(s) c/w skin tag(s)      Evenly pigmented macule c/w junctional nevus     Mildly variegated  pigmented, slightly irregular-bordered macule c/w mildly atypical nevus      Flesh colored to evenly pigmented papule c/w intradermal nevus       Pink pearly papule/plaque c/w basal cell carcinoma      Erythematous hyperkeratotic cursted plaque c/w SCC      Surgical scar with no sign of skin cancer recurrence      Open and closed comedones      Inflammatory papules and pustules      Verrucoid papule consistent consistent with wart     Erythematous eczematous patches and plaques     Dystrophic onycholytic nail with subungual debris c/w onychomycosis     Umbilicated papule    Erythematous-base heme-crusted tan verrucoid plaque consistent with inflamed seborrheic keratosis     Erythematous Silvery Scaling Plaque c/w Psoriasis     See annotation      Assessment / Plan:        Scar  NER NMSC    Personal history of other malignant neoplasm of skin  Area(s) of previous NMSC evaluated with no signs of recurrence.    Upper body skin examination performed today including at least 6 points as noted in physical examination. No lesions suspicious for malignancy noted.      Skin cancer screening  Area(s) of previous NMSC evaluated with no signs of recurrence.    Upper body skin examination performed today including at least 6 points as noted in physical examination. No lesions suspicious for malignancy noted.    Porokeratosis  Trunk, extremities  Reassurance  Discussed association with sun damage, premalignancy   Actinic keratoses  Cryosurgery Procedure Note    Verbal consent from the patient is obtained and the patient is aware of the precancerous quality and need for treatment of these lesions. Liquid nitrogen cryosurgery is applied to the 6 actinic keratoses, as detailed in the physical exam, to produce a freeze injury. The patient is aware that blisters may form and is instructed on wound care with gentle cleansing and use of vaseline ointment to keep moist until healed. The patient is supplied a handout on cryosurgery and is  instructed to call if lesions do not completely resolve. Discussed risk postinflammatory pigmentary changes.                No follow-ups on file.

## 2024-10-28 PROBLEM — N61.1 ABSCESS OF RIGHT BREAST: Status: RESOLVED | Noted: 2022-11-18 | Resolved: 2024-10-28

## 2024-11-27 NOTE — PROGRESS NOTES
[facility] Quality Flow/Interdisciplinary Rounds Progress Note        Quality Flow Rounds held on November 27, 2024    Disciplines Attending:  Bedside Nurse, , , and Nursing Unit Leadership    Himanshu Bravo was admitted on 11/12/2024  2:39 AM    Anticipated Discharge Date:       Disposition:    Aramis Score:  Aramis Scale Score: 15    Readmission Risk              Risk of Unplanned Readmission:  18           Discussed patient goal for the day, patient clinical progression, and barriers to discharge.  The following Goal(s) of the Day/Commitment(s) have been identified:  Diagnostics - Report Results and Labs - Report Results      Ann Norman RN  November 27, 2024         "  Subjective:       Patient ID:  Elizabeth Lane is a 63 y.o. female who presents for   Chief Complaint   Patient presents with    Spot     62 y/o F presents for evaluation of 1 spot on her back. Last Ov with  04/24/2018.  The spot has been present on her back x few years and was told it was fine.  It seemed to get irritated and "blister up" and get itchy.  No prior treatment.  She just wants to make sure it is ok    Derm Hx: H/o SCC. Denies family h/o melaonoma        Past Medical History:   Diagnosis Date    Congenital coronary artery anomaly     Liver enzyme elevation     Pure hypercholesterolemia     Scoliosis (and kyphoscoliosis), idiopathic     THORAIC SPINE    Unspecified hypothyroidism      Review of Systems   Constitutional: Negative for fever, chills and fatigue.   Skin: Positive for itching, dry skin, daily sunscreen use and activity-related sunscreen use. Negative for rash and wears hat.        Objective:    Physical Exam   Constitutional: She appears well-developed and well-nourished.   Neurological: She is alert and oriented to person, place, and time.   Psychiatric: She has a normal mood and affect.   Skin:   Areas Examined (abnormalities noted in diagram):   Head / Face Inspection Performed  Neck Inspection Performed  Back Inspection Performed              Diagram Legend     Erythematous scaling macule/papule c/w actinic keratosis       Vascular papule c/w angioma      Pigmented verrucoid papule/plaque c/w seborrheic keratosis      Yellow umbilicated papule c/w sebaceous hyperplasia      Irregularly shaped tan macule c/w lentigo     1-2 mm smooth white papules consistent with Milia      Movable subcutaneous cyst with punctum c/w epidermal inclusion cyst      Subcutaneous movable cyst c/w pilar cyst      Firm pink to brown papule c/w dermatofibroma      Pedunculated fleshy papule(s) c/w skin tag(s)      Evenly pigmented macule c/w junctional nevus     Mildly variegated pigmented, " slightly irregular-bordered macule c/w mildly atypical nevus      Flesh colored to evenly pigmented papule c/w intradermal nevus       Pink pearly papule/plaque c/w basal cell carcinoma      Erythematous hyperkeratotic cursted plaque c/w SCC      Surgical scar with no sign of skin cancer recurrence      Open and closed comedones      Inflammatory papules and pustules      Verrucoid papule consistent consistent with wart     Erythematous eczematous patches and plaques     Dystrophic onycholytic nail with subungual debris c/w onychomycosis     Umbilicated papule    Erythematous-base heme-crusted tan verrucoid plaque consistent with inflamed seborrheic keratosis     Erythematous Silvery Scaling Plaque c/w Psoriasis     See annotation      Assessment / Plan:        Seborrheic keratoses    These are benign inherited growths without a malignant potential. Reassurance given to patient. No treatment is necessary.   Discussed LN         Follow-up for due for skin check in April 2019.

## 2025-04-08 ENCOUNTER — OFFICE VISIT (OUTPATIENT)
Facility: CLINIC | Age: 70
End: 2025-04-08
Payer: MEDICARE

## 2025-04-08 DIAGNOSIS — Z85.828 PERSONAL HISTORY OF OTHER MALIGNANT NEOPLASM OF SKIN: ICD-10-CM

## 2025-04-08 DIAGNOSIS — L90.5 SCAR: Primary | ICD-10-CM

## 2025-04-08 DIAGNOSIS — L56.5 DSAP (DISSEMINATED SUPERFICIAL ACTINIC POROKERATOSIS): ICD-10-CM

## 2025-04-08 PROCEDURE — 4010F ACE/ARB THERAPY RXD/TAKEN: CPT | Mod: CPTII,S$GLB,, | Performed by: DERMATOLOGY

## 2025-04-08 PROCEDURE — G2211 COMPLEX E/M VISIT ADD ON: HCPCS | Mod: S$GLB,,, | Performed by: DERMATOLOGY

## 2025-04-08 PROCEDURE — 99999 PR PBB SHADOW E&M-EST. PATIENT-LVL II: CPT | Mod: PBBFAC,,, | Performed by: DERMATOLOGY

## 2025-04-08 PROCEDURE — 99213 OFFICE O/P EST LOW 20 MIN: CPT | Mod: S$GLB,,, | Performed by: DERMATOLOGY

## 2025-04-08 PROCEDURE — 1159F MED LIST DOCD IN RCRD: CPT | Mod: CPTII,S$GLB,, | Performed by: DERMATOLOGY

## 2025-04-08 PROCEDURE — 3288F FALL RISK ASSESSMENT DOCD: CPT | Mod: CPTII,S$GLB,, | Performed by: DERMATOLOGY

## 2025-04-08 PROCEDURE — 1101F PT FALLS ASSESS-DOCD LE1/YR: CPT | Mod: CPTII,S$GLB,, | Performed by: DERMATOLOGY

## 2025-04-08 NOTE — PROGRESS NOTES
Subjective:      Patient ID:  Elizabeth Lane is a 69 y.o. female who presents for   Chief Complaint   Patient presents with    Skin Check     HPI  Established Patient for [facility]  LOV 10/24  Has personal h/x of skin cancer   S/p mohs Dr. COOK- scc left cheek biopsied by [facility]  Yes Phx of NMSC.  +SCC- foot and left cheek  no Fhx of melanoma.     Review of Systems  Constitutional:  Negative for fever, chills and fatigue.   Skin:  Positive for itching, dry skin, daily sunscreen use and activity-related sunscreen use. Negative for rash and wears hat.   Hematologic/Lymphatic: Bruises/bleeds easily.        Review of Systems   Constitutional:  Negative for fever and chills.   Skin:  Negative for itching and rash.       Objective:   Physical Exam   Constitutional: She appears well-developed and well-nourished. No distress.   HENT:   Mouth/Throat: Lips normal.    Eyes: Lids are normal.    Cardiovascular:  There is no local extremity swelling.             Neurological: She is alert and oriented to person, place, and time. She is not disoriented.   Psychiatric: She has a normal mood and affect.   Skin:   Areas Examined (abnormalities noted in diagram):   Head / Face Inspection Performed  Neck Inspection Performed  Chest / Axilla Inspection Performed  Abdomen Inspection Performed  Back Inspection Performed  RUE Inspected  LUE Inspection Performed                 Diagram Legend     Erythematous scaling macule/papule c/w actinic keratosis       Vascular papule c/w angioma      Pigmented verrucoid papule/plaque c/w seborrheic keratosis      Yellow umbilicated papule c/w sebaceous hyperplasia      Irregularly shaped tan macule c/w lentigo     1-2 mm smooth white papules consistent with Milia      Movable subcutaneous cyst with punctum c/w epidermal inclusion cyst      Subcutaneous movable cyst c/w pilar cyst      Firm pink to brown papule c/w dermatofibroma      Pedunculated fleshy papule(s) c/w skin tag(s)      Evenly pigmented  macule c/w junctional nevus     Mildly variegated pigmented, slightly irregular-bordered macule c/w mildly atypical nevus      Flesh colored to evenly pigmented papule c/w intradermal nevus       Pink pearly papule/plaque c/w basal cell carcinoma      Erythematous hyperkeratotic cursted plaque c/w SCC      Surgical scar with no sign of skin cancer recurrence      Open and closed comedones      Inflammatory papules and pustules      Verrucoid papule consistent consistent with wart     Erythematous eczematous patches and plaques     Dystrophic onycholytic nail with subungual debris c/w onychomycosis     Umbilicated papule    Erythematous-base heme-crusted tan verrucoid plaque consistent with inflamed seborrheic keratosis     Erythematous Silvery Scaling Plaque c/w Psoriasis     See annotation      Assessment / Plan:        Scar  NER NMSC left cheek    Personal history of other malignant neoplasm of skin  Area(s) of previous NMSC evaluated with no signs of recurrence.    Upper body skin examination performed today including at least 6 points as noted in physical examination. No lesions suspicious for malignancy noted.      DSAP (disseminated superficial actinic porokeratosis)  Arms  Discussed sign of sun damage.   Sun precautions, regular skin exams , monitor for changes             No follow-ups on file.